# Patient Record
Sex: MALE | Race: WHITE | NOT HISPANIC OR LATINO | Employment: OTHER | URBAN - METROPOLITAN AREA
[De-identification: names, ages, dates, MRNs, and addresses within clinical notes are randomized per-mention and may not be internally consistent; named-entity substitution may affect disease eponyms.]

---

## 2018-03-08 ENCOUNTER — HOSPITAL ENCOUNTER (OUTPATIENT)
Dept: RADIOLOGY | Facility: MEDICAL CENTER | Age: 48
End: 2018-03-08
Attending: ORTHOPAEDIC SURGERY
Payer: COMMERCIAL

## 2018-03-08 DIAGNOSIS — M24.152 ARTICULAR CARTILAGE DISORDER OF HIP, LEFT: ICD-10-CM

## 2018-03-08 DIAGNOSIS — M25.552 LEFT HIP PAIN: ICD-10-CM

## 2018-03-08 PROCEDURE — A9579 GAD-BASE MR CONTRAST NOS,1ML: HCPCS | Performed by: ORTHOPAEDIC SURGERY

## 2018-03-08 PROCEDURE — 700117 HCHG RX CONTRAST REV CODE 255: Performed by: ORTHOPAEDIC SURGERY

## 2018-03-08 PROCEDURE — 20610 DRAIN/INJ JOINT/BURSA W/O US: CPT | Mod: LT

## 2018-03-08 PROCEDURE — 73722 MRI JOINT OF LWR EXTR W/DYE: CPT | Mod: LT

## 2018-03-08 PROCEDURE — 700101 HCHG RX REV CODE 250: Performed by: ORTHOPAEDIC SURGERY

## 2018-03-08 PROCEDURE — 700111 HCHG RX REV CODE 636 W/ 250 OVERRIDE (IP): Performed by: ORTHOPAEDIC SURGERY

## 2018-03-08 RX ORDER — TRIAMCINOLONE ACETONIDE 40 MG/ML
40 INJECTION, SUSPENSION INTRA-ARTICULAR; INTRAMUSCULAR ONCE
Status: COMPLETED | OUTPATIENT
Start: 2018-03-08 | End: 2018-03-08

## 2018-03-08 RX ORDER — LIDOCAINE HYDROCHLORIDE 10 MG/ML
20 INJECTION, SOLUTION INFILTRATION; PERINEURAL ONCE
Status: COMPLETED | OUTPATIENT
Start: 2018-03-08 | End: 2018-03-08

## 2018-03-08 RX ADMIN — IOHEXOL 50 ML: 300 INJECTION, SOLUTION INTRAVENOUS at 15:22

## 2018-03-08 RX ADMIN — GADODIAMIDE 5 ML: 287 INJECTION INTRAVENOUS at 15:22

## 2018-03-08 RX ADMIN — TRIAMCINOLONE ACETONIDE 40 MG: 40 INJECTION, SUSPENSION INTRA-ARTICULAR; INTRAMUSCULAR at 14:30

## 2018-03-08 RX ADMIN — LIDOCAINE HYDROCHLORIDE 6 ML: 10 INJECTION, SOLUTION INFILTRATION; PERINEURAL at 14:30

## 2018-04-05 ENCOUNTER — TELEPHONE (OUTPATIENT)
Dept: MEDICAL GROUP | Facility: LAB | Age: 48
End: 2018-04-05

## 2018-04-05 NOTE — TELEPHONE ENCOUNTER
ESTABLISHED PATIENT PRE-VISIT PLANNING     Note: Patient will not be contacted if there is no indication to call.     1.  Reviewed notes from the last few office visits within the medical group: Yes    2.  If any orders were placed at last visit or intended to be done for this visit (i.e. 6 mos follow-up), do we have Results/Consult Notes?        •  Labs - Labs were not ordered at last office visit.       •  Imaging - Imaging was not ordered at last office visit.       •  Referrals - No referrals were ordered at last office visit.    3. Is this appointment scheduled as a Hospital Follow-Up? No    4.  Immunizations were updated in Epic using WebIZ?: No WebIZ record       •  Web Iz Recommendations:     5.  Patient is due for the following Health Maintenance Topics:   Health Maintenance Due   Topic Date Due   • IMM DTaP/Tdap/Td Vaccine (1 - Tdap) 02/12/1989           6.  MDX printed for Provider? NO    7.  Patient was NOT informed to arrive 15 min prior to their scheduled appointment and bring in their medication bottles.

## 2018-04-06 ENCOUNTER — OFFICE VISIT (OUTPATIENT)
Dept: MEDICAL GROUP | Facility: LAB | Age: 48
End: 2018-04-06
Payer: COMMERCIAL

## 2018-04-06 VITALS
RESPIRATION RATE: 16 BRPM | OXYGEN SATURATION: 94 % | BODY MASS INDEX: 37.11 KG/M2 | TEMPERATURE: 97.9 F | HEART RATE: 70 BPM | DIASTOLIC BLOOD PRESSURE: 70 MMHG | SYSTOLIC BLOOD PRESSURE: 128 MMHG | WEIGHT: 274 LBS | HEIGHT: 72 IN

## 2018-04-06 DIAGNOSIS — Z13.220 SCREENING FOR HYPERLIPIDEMIA: ICD-10-CM

## 2018-04-06 DIAGNOSIS — R61 NIGHT SWEATS: ICD-10-CM

## 2018-04-06 DIAGNOSIS — R10.32 LLQ ABDOMINAL PAIN: ICD-10-CM

## 2018-04-06 DIAGNOSIS — E55.9 VITAMIN D DEFICIENCY: ICD-10-CM

## 2018-04-06 DIAGNOSIS — R06.83 SNORING: ICD-10-CM

## 2018-04-06 DIAGNOSIS — R73.01 ELEVATED FASTING GLUCOSE: ICD-10-CM

## 2018-04-06 DIAGNOSIS — E66.9 OBESITY (BMI 30-39.9): ICD-10-CM

## 2018-04-06 DIAGNOSIS — M25.552 CHRONIC LEFT HIP PAIN: ICD-10-CM

## 2018-04-06 DIAGNOSIS — G89.29 CHRONIC LEFT HIP PAIN: ICD-10-CM

## 2018-04-06 DIAGNOSIS — F17.200 TOBACCO DEPENDENCE: ICD-10-CM

## 2018-04-06 PROCEDURE — 99214 OFFICE O/P EST MOD 30 MIN: CPT | Performed by: FAMILY MEDICINE

## 2018-04-06 ASSESSMENT — ENCOUNTER SYMPTOMS
BRUISES/BLEEDS EASILY: 0
NECK PAIN: 0
HEADACHES: 0
SHORTNESS OF BREATH: 0
DEPRESSION: 0
ROS GI COMMENTS: HEMORRHOIDS
ABDOMINAL PAIN: 1
DOUBLE VISION: 0

## 2018-04-06 ASSESSMENT — PATIENT HEALTH QUESTIONNAIRE - PHQ9: CLINICAL INTERPRETATION OF PHQ2 SCORE: 0

## 2018-04-06 NOTE — PROGRESS NOTES
Subjective:      Matthias Medina is a 48 y.o. male who presents with     Chief Complaint   Patient presents with   • Abdominal Pain     left lower X 1year             HPI    LLQ abdominal pain. This is an ongoing, on the left lower side. A couple providers have looked at it. Had colonscopy and didn't find anything. This was > 1 year ago. Pain depends. Has it now. Is not all the time. Happens every day. Has had this > 1 year. Tried changing diet and no help. Pain is kind of sharp. Does not radiate.  No fever. At night he will have some hot flashes and he gets soaking wet. Area of pain in the LLQ is getting bigger over time     Vitamin d deficiency. He is taking vitamin d with milk. Elbow pain is better.     Sleeps pretty good. Will wake up at 3 to go to the bathroom. Does snore and does not stop breathing. Some daytime fatigue. Just started going to the gym. ESS score of 6 today     New patient health questionnaire reviewed and scanned into media    Patient Active Problem List   Diagnosis   • LLQ abdominal pain   • Elevated fasting glucose   • Vitamin D deficiency   • Tobacco dependence   • Chronic left hip pain   • Snoring   • Obesity (BMI 30-39.9)   • Night sweats       Family History   Problem Relation Age of Onset   • Hypertension Father    • Diabetes Father    • Lung Disease Neg Hx    • Cancer Neg Hx    • Heart Disease Neg Hx    • Stroke Neg Hx      Social History     Social History   • Marital status:      Spouse name: N/A   • Number of children: N/A   • Years of education: N/A     Occupational History   • Not on file.     Social History Main Topics   • Smoking status: Never Smoker   • Smokeless tobacco: Current User     Types: Chew      Comment: one can a day    • Alcohol use 0.0 oz/week      Comment: socially   • Drug use: No   • Sexual activity: Yes     Partners: Female     Other Topics Concern   • Not on file     Social History Narrative   • No narrative on file     No current outpatient prescriptions  on file.      Review of Systems   Constitutional:        Frequent infections or boils   HENT: Negative for hearing loss.    Eyes: Negative for double vision.   Respiratory: Negative for shortness of breath.    Cardiovascular: Negative for chest pain.   Gastrointestinal: Positive for abdominal pain.        Hemorrhoids   Genitourinary:        Kidney stones   Musculoskeletal: Negative for neck pain.   Neurological: Negative for headaches.   Endo/Heme/Allergies: Does not bruise/bleed easily.   Psychiatric/Behavioral: Negative for depression.     New patient health questionnaire reviewed and scanned into media     Objective:     /70   Pulse 70   Temp 36.6 °C (97.9 °F)   Resp 16   Ht 1.829 m (6')   Wt 124.3 kg (274 lb)   SpO2 94%   BMI 37.16 kg/m²       Physical Exam   Constitutional: He appears well-developed and well-nourished. He is active and cooperative.  Non-toxic appearance. He does not have a sickly appearance. He does not appear ill. No distress.   HENT:   Head: Normocephalic and atraumatic.   Right Ear: Tympanic membrane normal.   Left Ear: Tympanic membrane normal.   Mouth/Throat: Oropharynx is clear and moist and mucous membranes are normal.   Large tongue, large neck, narrow oropharynx   Eyes: Conjunctivae and EOM are normal.   Cardiovascular: Normal rate, regular rhythm and normal heart sounds.    Pulmonary/Chest: Effort normal and breath sounds normal. No tachypnea. No respiratory distress. He has no decreased breath sounds. He has no wheezes. He has no rhonchi. He has no rales.   Abdominal: Soft. He exhibits no distension. There is tenderness in the left lower quadrant. There is no rigidity, no rebound and no guarding.   Obese    Neurological: He is alert. He is not disoriented. He displays no tremor. He displays no seizure activity. Coordination and gait normal.   Skin: Skin is warm and dry. He is not diaphoretic.   Psychiatric: He has a normal mood and affect. His speech is normal and  behavior is normal. He is not actively hallucinating. He is attentive.               3/8/2018 4:30 PM    HISTORY/REASON FOR EXAM:  Hip pain no trauma  Left hip pain    TECHNIQUE/EXAM DESCRIPTION:  MR arthrogram of the LEFT hip.    Using a [employer] 1.5 Rosa MRI scanner, T1 oblique coronal, axial, and sagittal fat-suppressed, fast spin-echo T2 fat-suppressed coronal, and 3D fast spoiled-gradient fat-suppressed coronal, and radial T1 images were obtained of the hip following the   intra-articular administration of 0.1 mL dilute Omniscan.    COMPARISON: Images from injection procedure 3/8/2018    FINDINGS:  BONES, BONE MARROW, and CARTILAGE: There is minimal convexity at the lateral femoral head/neck junction. This morphologic abnormality can be associated with the clinical syndrome of femoroacetabular impingement.    Articular cartilage is intact    Bone marrow is normal    ACETABULAR LABRUM: Normal in appearance.    TENDONS: Normal in appearance.    VISUALIZED PELVIS: Within normal limits    LUMBAR SPINE: Within normal limits   Impression       1.  Intact acetabular labrum and articular cartilage    2.  Minimal convexity at the lateral femoral head/neck junction which can be associated with the clinical syndrome of femoroacetabular impingement     [place] Sleepiness Scale score of 6     Assessment/Plan:     1. LLQ abdominal pain  Uncontrolled. Check labs, CT of the abdomen, pelvis.  - COMP METABOLIC PANEL; Future  - CBC WITH DIFFERENTIAL; Future  - CT-ABDOMEN-PELVIS WITH & W/O; Future    2. Vitamin D deficiency  Check vitamin D level  - VITAMIN D,25 HYDROXY; Future    3. Elevated fasting glucose  Check labs  - COMP METABOLIC PANEL; Future  - HEMOGLOBIN A1C; Future    4. Chronic left hip pain  Followed by specialists    5. Snoring  May need to do sleep study    6. Tobacco dependence  Encouraged chewing tobacco cessation    7. Obesity (BMI 30-39.9)  Patient is working on weight loss    8. Night sweats  Uncontrolled.  Check labs  - COMP METABOLIC PANEL; Future  - CBC WITH DIFFERENTIAL; Future  - TSH; Future  - FREE THYROXINE; Future    9. Screening for hyperlipidemia  Check fasting lipid panel  - LIPID PROFILE; Future      Likely patient will need to have a sleep study. He has an [place] Sleepiness Scale score of 6. He is trying to lose weight however. We did discuss this today    Patient is a follow-up after he has his labs done and after he has the CT scan of his abdomen.      Please note that this dictation was created using voice recognition software. I have made every reasonable attempt to correct obvious errors, but I expect that there are errors of grammar and possibly content that I did not discover before finalizing the note.

## 2018-04-16 ENCOUNTER — NON-PROVIDER VISIT (OUTPATIENT)
Dept: MEDICAL GROUP | Facility: CLINIC | Age: 48
End: 2018-04-16
Payer: COMMERCIAL

## 2018-04-16 ENCOUNTER — HOSPITAL ENCOUNTER (OUTPATIENT)
Facility: MEDICAL CENTER | Age: 48
End: 2018-04-16
Attending: FAMILY MEDICINE
Payer: COMMERCIAL

## 2018-04-16 DIAGNOSIS — R10.32 LLQ ABDOMINAL PAIN: ICD-10-CM

## 2018-04-16 DIAGNOSIS — R73.01 ELEVATED FASTING GLUCOSE: ICD-10-CM

## 2018-04-16 DIAGNOSIS — R61 NIGHT SWEATS: ICD-10-CM

## 2018-04-16 DIAGNOSIS — Z13.220 SCREENING FOR HYPERLIPIDEMIA: ICD-10-CM

## 2018-04-16 DIAGNOSIS — E55.9 VITAMIN D DEFICIENCY: ICD-10-CM

## 2018-04-16 DIAGNOSIS — Z01.89 ROUTINE LAB DRAW: ICD-10-CM

## 2018-04-16 LAB
25(OH)D3 SERPL-MCNC: 20 NG/ML (ref 30–100)
ALBUMIN SERPL BCP-MCNC: 4.2 G/DL (ref 3.2–4.9)
ALBUMIN/GLOB SERPL: 1.8 G/DL
ALP SERPL-CCNC: 50 U/L (ref 30–99)
ALT SERPL-CCNC: 41 U/L (ref 2–50)
ANION GAP SERPL CALC-SCNC: 7 MMOL/L (ref 0–11.9)
AST SERPL-CCNC: 25 U/L (ref 12–45)
BASOPHILS # BLD AUTO: 0.5 % (ref 0–1.8)
BASOPHILS # BLD: 0.04 K/UL (ref 0–0.12)
BILIRUB SERPL-MCNC: 0.4 MG/DL (ref 0.1–1.5)
BUN SERPL-MCNC: 14 MG/DL (ref 8–22)
CALCIUM SERPL-MCNC: 9.3 MG/DL (ref 8.5–10.5)
CHLORIDE SERPL-SCNC: 105 MMOL/L (ref 96–112)
CHOLEST SERPL-MCNC: 195 MG/DL (ref 100–199)
CO2 SERPL-SCNC: 26 MMOL/L (ref 20–33)
CREAT SERPL-MCNC: 1.1 MG/DL (ref 0.5–1.4)
EOSINOPHIL # BLD AUTO: 0.12 K/UL (ref 0–0.51)
EOSINOPHIL NFR BLD: 1.6 % (ref 0–6.9)
ERYTHROCYTE [DISTWIDTH] IN BLOOD BY AUTOMATED COUNT: 42.2 FL (ref 35.9–50)
GLOBULIN SER CALC-MCNC: 2.4 G/DL (ref 1.9–3.5)
GLUCOSE SERPL-MCNC: 146 MG/DL (ref 65–99)
HCT VFR BLD AUTO: 48.2 % (ref 42–52)
HDLC SERPL-MCNC: 49 MG/DL
HGB BLD-MCNC: 15.5 G/DL (ref 14–18)
IMM GRANULOCYTES # BLD AUTO: 0.03 K/UL (ref 0–0.11)
IMM GRANULOCYTES NFR BLD AUTO: 0.4 % (ref 0–0.9)
LDLC SERPL CALC-MCNC: 116 MG/DL
LYMPHOCYTES # BLD AUTO: 1.78 K/UL (ref 1–4.8)
LYMPHOCYTES NFR BLD: 23.8 % (ref 22–41)
MCH RBC QN AUTO: 29.2 PG (ref 27–33)
MCHC RBC AUTO-ENTMCNC: 32.2 G/DL (ref 33.7–35.3)
MCV RBC AUTO: 90.8 FL (ref 81.4–97.8)
MONOCYTES # BLD AUTO: 0.5 K/UL (ref 0–0.85)
MONOCYTES NFR BLD AUTO: 6.7 % (ref 0–13.4)
NEUTROPHILS # BLD AUTO: 5 K/UL (ref 1.82–7.42)
NEUTROPHILS NFR BLD: 67 % (ref 44–72)
NRBC # BLD AUTO: 0 K/UL
NRBC BLD-RTO: 0 /100 WBC
PLATELET # BLD AUTO: 192 K/UL (ref 164–446)
PMV BLD AUTO: 13 FL (ref 9–12.9)
POTASSIUM SERPL-SCNC: 4.4 MMOL/L (ref 3.6–5.5)
PROT SERPL-MCNC: 6.6 G/DL (ref 6–8.2)
RBC # BLD AUTO: 5.31 M/UL (ref 4.7–6.1)
SODIUM SERPL-SCNC: 138 MMOL/L (ref 135–145)
T4 FREE SERPL-MCNC: 0.8 NG/DL (ref 0.53–1.43)
TRIGL SERPL-MCNC: 148 MG/DL (ref 0–149)
TSH SERPL DL<=0.005 MIU/L-ACNC: 2.19 UIU/ML (ref 0.38–5.33)
WBC # BLD AUTO: 7.5 K/UL (ref 4.8–10.8)

## 2018-04-16 PROCEDURE — 84439 ASSAY OF FREE THYROXINE: CPT

## 2018-04-16 PROCEDURE — 83036 HEMOGLOBIN GLYCOSYLATED A1C: CPT

## 2018-04-16 PROCEDURE — 85025 COMPLETE CBC W/AUTO DIFF WBC: CPT

## 2018-04-16 PROCEDURE — 80061 LIPID PANEL: CPT

## 2018-04-16 PROCEDURE — 84443 ASSAY THYROID STIM HORMONE: CPT

## 2018-04-16 PROCEDURE — 99000 SPECIMEN HANDLING OFFICE-LAB: CPT | Performed by: PHYSICIAN ASSISTANT

## 2018-04-16 PROCEDURE — 82306 VITAMIN D 25 HYDROXY: CPT

## 2018-04-16 PROCEDURE — 36415 COLL VENOUS BLD VENIPUNCTURE: CPT | Performed by: PHYSICIAN ASSISTANT

## 2018-04-16 PROCEDURE — 80053 COMPREHEN METABOLIC PANEL: CPT

## 2018-04-17 ENCOUNTER — TELEPHONE (OUTPATIENT)
Dept: MEDICAL GROUP | Facility: LAB | Age: 48
End: 2018-04-17

## 2018-04-17 LAB
EST. AVERAGE GLUCOSE BLD GHB EST-MCNC: 143 MG/DL
HBA1C MFR BLD: 6.6 % (ref 0–5.6)

## 2018-04-17 NOTE — LETTER
April 19, 2018        Matthias Medina  [id number] [facility] [address]        Dear Matthias:    Please call and schedule an appointment to follow up on recent labs.    If you have any questions or concerns, please don't hesitate to call.        Sincerely,        Rosalva Holland M.D.    Electronically Signed

## 2018-04-17 NOTE — TELEPHONE ENCOUNTER
----- Message from Rosalva Holland M.D. sent at 4/17/2018 12:09 PM PDT -----  Please have patient make an appt to discuss labs

## 2018-05-03 ENCOUNTER — HOSPITAL ENCOUNTER (OUTPATIENT)
Dept: RADIOLOGY | Facility: MEDICAL CENTER | Age: 48
End: 2018-05-03
Attending: FAMILY MEDICINE
Payer: COMMERCIAL

## 2018-05-03 DIAGNOSIS — R10.32 LLQ ABDOMINAL PAIN: ICD-10-CM

## 2018-05-03 PROCEDURE — 700117 HCHG RX CONTRAST REV CODE 255: Performed by: FAMILY MEDICINE

## 2018-05-03 PROCEDURE — 74177 CT ABD & PELVIS W/CONTRAST: CPT

## 2018-05-03 RX ADMIN — IOHEXOL 100 ML: 350 INJECTION, SOLUTION INTRAVENOUS at 10:30

## 2018-05-16 ENCOUNTER — TELEPHONE (OUTPATIENT)
Dept: MEDICAL GROUP | Facility: LAB | Age: 48
End: 2018-05-16

## 2018-05-16 NOTE — TELEPHONE ENCOUNTER
ESTABLISHED PATIENT PRE-VISIT PLANNING     Note: Patient will not be contacted if there is no indication to call.     1.  Reviewed notes from the last few office visits within the medical group: Yes    2.  If any orders were placed at last visit or intended to be done for this visit (i.e. 6 mos follow-up), do we have Results/Consult Notes?        •  Labs - Labs ordered, completed on 4/16/18 and results are in chart.       •  Imaging - Imaging ordered, completed and results are in chart.       •  Referrals - No referrals were ordered at last office visit.    3. Is this appointment scheduled as a Hospital Follow-Up? No    4.  Immunizations were updated in Epic using WebIZ?: No WebIZ record       •  Web Iz Recommendations:     5.  Patient is due for the following Health Maintenance Topics:   Health Maintenance Due   Topic Date Due   • IMM DTaP/Tdap/Td Vaccine (1 - Tdap) 02/12/1989           6.  MDX printed for Provider? NO    7.  Patient was NOT informed to arrive 15 min prior to their scheduled appointment and bring in their medication bottles.

## 2018-05-17 ENCOUNTER — APPOINTMENT (OUTPATIENT)
Dept: MEDICAL GROUP | Facility: LAB | Age: 48
End: 2018-05-17
Payer: COMMERCIAL

## 2018-06-06 ENCOUNTER — TELEPHONE (OUTPATIENT)
Dept: MEDICAL GROUP | Facility: LAB | Age: 48
End: 2018-06-06

## 2018-06-06 NOTE — TELEPHONE ENCOUNTER
ESTABLISHED PATIENT PRE-VISIT PLANNING     Note: Patient will not be contacted if there is no indication to call.     1.  Reviewed notes from the last few office visits within the medical group: Yes    2.  If any orders were placed at last visit or intended to be done for this visit (i.e. 6 mos follow-up), do we have Results/Consult Notes?        •  Labs - Labs ordered, completed on 4/17/18 and results are in chart.       •  Imaging - Imaging ordered, completed and results are in chart.       •  Referrals - No referrals were ordered at last office visit.    3. Is this appointment scheduled as a Hospital Follow-Up? No    4.  Immunizations were updated in Epic using WebIZ?: No WebIZ record       •  Web Iz Recommendations:     5.  Patient is due for the following Health Maintenance Topics:   Health Maintenance Due   Topic Date Due   • IMM DTaP/Tdap/Td Vaccine (1 - Tdap) 02/12/1989           6.  MDX printed for Provider? NO    7.  Patient was NOT informed to arrive 15 min prior to their scheduled appointment and bring in their medication bottles.

## 2018-06-21 ENCOUNTER — TELEPHONE (OUTPATIENT)
Dept: MEDICAL GROUP | Facility: LAB | Age: 48
End: 2018-06-21

## 2018-06-22 ENCOUNTER — APPOINTMENT (OUTPATIENT)
Dept: MEDICAL GROUP | Facility: LAB | Age: 48
End: 2018-06-22
Payer: COMMERCIAL

## 2018-06-28 ENCOUNTER — OFFICE VISIT (OUTPATIENT)
Dept: MEDICAL GROUP | Facility: LAB | Age: 48
End: 2018-06-28
Payer: COMMERCIAL

## 2018-06-28 VITALS
RESPIRATION RATE: 12 BRPM | BODY MASS INDEX: 36.84 KG/M2 | SYSTOLIC BLOOD PRESSURE: 106 MMHG | WEIGHT: 272 LBS | OXYGEN SATURATION: 91 % | DIASTOLIC BLOOD PRESSURE: 78 MMHG | TEMPERATURE: 98.1 F | HEIGHT: 72 IN | HEART RATE: 62 BPM

## 2018-06-28 DIAGNOSIS — K76.0 FATTY LIVER: ICD-10-CM

## 2018-06-28 DIAGNOSIS — E11.9 CONTROLLED TYPE 2 DIABETES MELLITUS WITHOUT COMPLICATION, WITHOUT LONG-TERM CURRENT USE OF INSULIN (HCC): ICD-10-CM

## 2018-06-28 DIAGNOSIS — K57.30 DIVERTICULOSIS OF LARGE INTESTINE WITHOUT HEMORRHAGE: ICD-10-CM

## 2018-06-28 PROCEDURE — 99214 OFFICE O/P EST MOD 30 MIN: CPT | Performed by: NURSE PRACTITIONER

## 2018-06-28 NOTE — PROGRESS NOTES
Chief Complaint   Patient presents with   • Abdominal Pain     F/V CT scan & lab results        DAWNA Rizzo is a 48-year-old established male here to follow-up on labs and CT scan done a few months ago.  Unfortunately his A1c showed that he has new onset type 2 diabetes.  He tells me that in the past he has been told he has prediabetes.  He feels well, he denies any symptoms related to this such as changes in his vision, urination or peripheral sensations.  Denies polyuria.  He does admit that he eats a lot of carbohydrates, typically biscuits and gravy in the morning around 10 AM with possibly hashbrowns or potatoes and then frequently pasta at night.  He drinks whiskey a few nights per week and fairly large amounts, stating he fills up a glass.  He does not exercise at all.  He does have a family history of type 2 diabetes in his father.  He is not on any medications for diabetes.    He like to review his CT scan also.  His CT scan shows a fatty liver and diverticulosis as well as a few small renal cysts.  He suffers from intermittent left lower quadrant pain but tells me this has improved with eliminating some gas producing foods.  He is not having any nausea, vomiting, unintentional weight loss, diarrhea but has occasional constipation.      Past medical, surgical, family, and social history is reviewed and updated in Epic chart by me today.   Medications and allergies reviewed and updated in Epic chart by me today.     ROS:   As documented in history of present illness above    Exam:  Blood pressure 106/78, pulse 62, temperature 36.7 °C (98.1 °F), resp. rate 12, height 1.829 m (6'), weight 123.4 kg (272 lb), SpO2 91 %.  Gen. morbidly obese male, appears healthy in no distress   Skin appropriate for sex and age   HEENT unremarkable   Neck no adenopathy, no nodules or masses palpable   Lungs clear   Heart regular   Extremities no edema   Abdomen: Rounded but nontender  Neuro gait and station normal   Psych  appropriate, calm and interactive.  Very receptive to the conversation.    A/P:  1. Controlled type 2 diabetes mellitus without complication, without long-term current use of insulin (HCC)  -Our visit today was spent discussing his diet, his weight and his exercise routine.  We thoroughly discussed new onset type 2 diabetes.  At this point he is very motivated to lose weight, start exercising and changing his diet.  He declined starting any medication or checking his blood sugars.  He is willing to reconsider this in 2 and half months at our follow-up visit for an A1c recheck.  We went through every meal that he eats as well as all of his liquid intake and he was given advice on changes that he needs to make.  We discussed long-term repercussions of type 2 diabetes and he verbalized understanding.  He declined diabetic educational classes.  He was fairly overwhelmed at the end of our visit, we will discuss statins and ACE inhibitors as well as vaccines and foot care at our next visit.    2. Fatty liver  -As above, he will start changing his diet to include much more fiber and less animal fat.    3. Diverticulosis of large intestine without hemorrhage  -Improving with some dietary changes he has already made.  He is not in any pain today.  We discussed symptoms of diverticulitis.    Total face to face time 25 minutes of which over 50% of this visit is spent in counseling, education and outlining a plan of treatment and coordination of care for the above conditions. This included but was not limited to discussion of medication options and potential risks related to the medications, referral and specialty care options. All patient questions were answered

## 2018-08-06 ENCOUNTER — PATIENT OUTREACH (OUTPATIENT)
Dept: HEALTH INFORMATION MANAGEMENT | Facility: OTHER | Age: 48
End: 2018-08-06

## 2018-08-06 NOTE — PROGRESS NOTES
Outcome: Left Message    Please transfer to Patient Outreach Team at [phone] when patient returns call.    WebIZ Checked & Epic Updated:  yes    HealthConnect Verified: yes    Attempt # 1

## 2018-08-16 ENCOUNTER — HOSPITAL ENCOUNTER (OUTPATIENT)
Dept: RADIOLOGY | Facility: MEDICAL CENTER | Age: 48
End: 2018-08-16
Attending: CHIROPRACTOR
Payer: COMMERCIAL

## 2018-08-16 DIAGNOSIS — M51.26 DISPLACEMENT OF LUMBAR INTERVERTEBRAL DISC WITHOUT MYELOPATHY: ICD-10-CM

## 2018-08-16 PROCEDURE — 72148 MRI LUMBAR SPINE W/O DYE: CPT

## 2018-09-03 NOTE — PROGRESS NOTES
Outcome: Left Message    Please transfer to Patient Outreach Team at [phone] when patient returns call.    Attempt # 2

## 2018-09-10 ENCOUNTER — HOSPITAL ENCOUNTER (OUTPATIENT)
Dept: RADIOLOGY | Facility: MEDICAL CENTER | Age: 48
End: 2018-09-10
Attending: NEUROLOGICAL SURGERY
Payer: COMMERCIAL

## 2018-09-10 DIAGNOSIS — M54.50 CHRONIC LOW BACK PAIN WITHOUT SCIATICA, UNSPECIFIED BACK PAIN LATERALITY: ICD-10-CM

## 2018-09-10 DIAGNOSIS — G89.29 CHRONIC LOW BACK PAIN WITHOUT SCIATICA, UNSPECIFIED BACK PAIN LATERALITY: ICD-10-CM

## 2018-09-10 PROCEDURE — 72110 X-RAY EXAM L-2 SPINE 4/>VWS: CPT

## 2018-09-14 ENCOUNTER — TELEPHONE (OUTPATIENT)
Dept: MEDICAL GROUP | Facility: LAB | Age: 48
End: 2018-09-14

## 2018-09-14 DIAGNOSIS — E11.9 CONTROLLED TYPE 2 DIABETES MELLITUS WITHOUT COMPLICATION, WITHOUT LONG-TERM CURRENT USE OF INSULIN (HCC): ICD-10-CM

## 2018-09-14 NOTE — PROGRESS NOTES
1. Attempt #:Final    2. WebIZ Checked & Epic Updated: Yes  3. HealthConnect Verified: yes  4. Verify PCP: yes    5. Communication Preference Obtained: no    6. Diabetes Visit Scheduling  Scheduling Status:Already scheduled      7. Care Gap Scheduling (Attempt to Schedule EACH Overdue Care Gap!)    Health Maintenance Due   Topic Date Due   • DIABETES MONOFILAMENT / LE EXAM  1970   • RETINAL SCREENING  02/12/1988   • URINE ACR / MICROALBUMIN  02/12/1988   • IMM HEP B VACCINE (1 of 3 - Risk 3-dose series) 02/12/1989   • IMM DTaP/Tdap/Td Vaccine (1 - Tdap) 02/12/1989   • IMM PNEUMOCOCCAL 19-64 (ADULT) MEDIUM RISK SERIES (1 of 1 - PPSV23) 02/12/1989   • IMM INFLUENZA (1) 09/01/2018 / Declined Immunizations        8. Patient was directed to Health and Wellness Website: not able to address / Pt hung up  9. Screened for Food Pantry Prescription? no  10. Medabil Activation: already active  11. Medabil Hal: no  12. Virtual Visits: no  13. Opt In to Text Messages: no

## 2018-09-20 ENCOUNTER — TELEPHONE (OUTPATIENT)
Dept: MEDICAL GROUP | Facility: LAB | Age: 48
End: 2018-09-20

## 2018-09-20 ENCOUNTER — OFFICE VISIT (OUTPATIENT)
Dept: MEDICAL GROUP | Facility: LAB | Age: 48
End: 2018-09-20
Payer: COMMERCIAL

## 2018-09-20 VITALS
RESPIRATION RATE: 14 BRPM | BODY MASS INDEX: 36.57 KG/M2 | OXYGEN SATURATION: 93 % | HEART RATE: 73 BPM | TEMPERATURE: 98.2 F | DIASTOLIC BLOOD PRESSURE: 64 MMHG | HEIGHT: 72 IN | WEIGHT: 270 LBS | SYSTOLIC BLOOD PRESSURE: 124 MMHG

## 2018-09-20 DIAGNOSIS — F17.200 TOBACCO DEPENDENCE: ICD-10-CM

## 2018-09-20 DIAGNOSIS — E11.9 TYPE 2 DIABETES MELLITUS WITHOUT COMPLICATION, WITHOUT LONG-TERM CURRENT USE OF INSULIN (HCC): ICD-10-CM

## 2018-09-20 DIAGNOSIS — R79.89 LOW TESTOSTERONE: ICD-10-CM

## 2018-09-20 DIAGNOSIS — E55.9 VITAMIN D DEFICIENCY: ICD-10-CM

## 2018-09-20 DIAGNOSIS — E66.9 OBESITY (BMI 35.0-39.9 WITHOUT COMORBIDITY): ICD-10-CM

## 2018-09-20 LAB
HBA1C MFR BLD: 6.2 % (ref ?–5.8)
INT CON NEG: NEGATIVE
INT CON POS: POSITIVE

## 2018-09-20 PROCEDURE — 83036 HEMOGLOBIN GLYCOSYLATED A1C: CPT | Performed by: NURSE PRACTITIONER

## 2018-09-20 PROCEDURE — 99214 OFFICE O/P EST MOD 30 MIN: CPT | Performed by: NURSE PRACTITIONER

## 2018-09-20 RX ORDER — TESTOSTERONE CYPIONATE 200 MG/ML
100 INJECTION, SOLUTION INTRAMUSCULAR ONCE
Qty: 1 VIAL | Refills: 5 | Status: SHIPPED
Start: 2018-09-20 | End: 2018-09-20

## 2018-09-20 NOTE — TELEPHONE ENCOUNTER
----- Message from ERMA Porter sent at 9/20/2018  7:13 AM PDT -----  Please obtain copies of labs ordered by Bess Dunham in [employer].  Thanks!

## 2018-09-20 NOTE — TELEPHONE ENCOUNTER
MEDICATION PRIOR AUTHORIZATION NEEDED:    1. Name of Medication: testosterone cypionate (DEPO-TESTOSTERONE)    2. Requested By (Name of Pharmacy): [facility]     3. Is insurance on file current? yes    4. What is the name & phone number of the 3rd party payor? [email or web address]

## 2018-09-20 NOTE — PROGRESS NOTES
"Chief Complaint   Patient presents with   • Diabetes       HPI  Matthias is a 48-year-old established male here to follow-up on new onset type 2 diabetes, diagnosed at our visit in June.  His A1c was initially at 6.6.  He tells me that he is made several changes in his lifestyle, cutting out alcohol during the week, reducing his intake of sweets, trying to walk more and reducing his intake of biscuits and hashbrowns in the morning.  He has lost 2 pounds.  He is feeling better.  Denies any vision changes, numbness or tingling to his feet.  Denies any other associated symptoms.  He does not have hypertension or hyperlipidemia.    Vitamin D deficiency: This was also diagnosed at last visit.  He is taking 2000 international units of vitamin D daily.    Testosterone deficiency: Diagnosed by a different primary care in [place], [place].  He was prescribed a topical testosterone which he will not use as his wife has had breast cancer.  He was then told to change to testosterone injections which coincidentally he ended up traveling to [place] immediately afterwards and bought testosterone over-the-counter there.  He and his wife are concerned about the quality of testosterone that they bought in [place] as it caused a large bubble where they injected it.  He did feel better with the testosterone injection.  They have no idea how much testosterone they injected.  He felt more energy for last few weeks after his testosterone injection.  He does struggle with fatigue.      Past medical, surgical, family, and social history is reviewed and updated in Epic chart by me today.   Medications and allergies reviewed and updated in Epic chart by me today.     ROS:   As documented in history of present illness above    Exam:  Blood pressure 124/64, pulse 73, temperature 36.8 °C (98.2 °F), resp. rate 14, height 1.829 m (6' 0.01\"), weight 122.5 kg (270 lb), SpO2 93 %.  Constitutional: Alert, no distress, plus 3 vital signs  Skin:  Warm, dry, no " rashes invisible areas  Eye: Equal, round and reactive, conjunctiva clear  ENMT: Lips without lesions, good dentition, oropharynx clear    Neck: Trachea midline  Respiratory: Unlabored respiration, lungs clear to auscultation, no wheezes, no rhonchi  Cardiovascular: Normal rate and rhythm.  Psych: Alert, pleasant, well-groomed, normal affect  Monofilament testing with a 10 gram force: sensation intact in 4/4 bilaterally.   Visual Inspection: Feet without maceration, ulcers, fissures.       A/P:  1. Type 2 diabetes mellitus without complication, without long-term current use of insulin ([facility])  -A1c today is improved at 6.2.  We are very encouraged by this mutually.  He will continue making further dietary improvements and work harder on exercise.  We set a goal weight loss of 5-10 pounds in the next 3 months, hopefully more if possible.  Discussed the importance of proper foot care and seeing his eye doctor yearly.  Recommend lab work prior to next visit.  - POCT  A1C  - CBC WITH DIFFERENTIAL; Future  - HEMOGLOBIN A1C; Future  - Diabetic Monofilament Lower Extremity Exam    2. Low testosterone  -We will start him on 100 mg of testosterone once a month, he would prefer to come here for that.  Will check his testosterone in 3 months.  Discussed the risk versus benefit of testosterone replacement therapy with the patient and his wife including the potential increased risk of prostate cancer and blood clots.  Encouraged him to donate blood when possible.  - testosterone cypionate (DEPO-TESTOSTERONE) 200 MG/ML Solution injection; 0.5 mL by Intramuscular route Once for 1 dose.  Dispense: 1 Vial; Refill: 5  - CBC WITH DIFFERENTIAL; Future  - TESTOSTERONE SERUM; Future  -declines vaccinations today.     3. Vitamin D deficiency  -He will continue on vitamin D replacement  - VITAMIN D,25 HYDROXY; Future  - CBC WITH DIFFERENTIAL; Future    4. Tobacco dependence  -Counseled regarding the importance of tobacco cessation, he  chews.    5. Obesity (BMI 35.0-39.9 without comorbidity)  -Counseled regarding the importance of weight loss.  Discussed the dangers of morbid obesity with him.  - Patient identified as having weight management issue.  Appropriate orders and counseling given.

## 2018-10-02 NOTE — TELEPHONE ENCOUNTER
FINAL PRIOR AUTHORIZATION STATUS:    1.  Name of Medication & Dose: testosterone cypionate vial     2. Prior Auth Status: Approved through 09/28/18 to 03/27/19     3. Action Taken: Pharmacy Notified: yes Patient Notified: N\A

## 2018-10-05 ENCOUNTER — TELEPHONE (OUTPATIENT)
Dept: MEDICAL GROUP | Facility: LAB | Age: 48
End: 2018-10-05

## 2018-10-05 ENCOUNTER — NON-PROVIDER VISIT (OUTPATIENT)
Dept: MEDICAL GROUP | Facility: LAB | Age: 48
End: 2018-10-05
Payer: COMMERCIAL

## 2018-10-05 DIAGNOSIS — E29.1 HYPOGONADISM IN MALE: ICD-10-CM

## 2018-10-05 DIAGNOSIS — R79.89 LOW TESTOSTERONE IN MALE: ICD-10-CM

## 2018-10-05 PROCEDURE — 96372 THER/PROPH/DIAG INJ SC/IM: CPT | Performed by: INTERNAL MEDICINE

## 2018-10-05 RX ORDER — TESTOSTERONE CYPIONATE 200 MG/ML
200 INJECTION, SOLUTION INTRAMUSCULAR ONCE
Qty: 1 ML | Refills: 0 | OUTPATIENT
Start: 2018-10-05 | End: 2018-10-05

## 2018-10-05 RX ORDER — TESTOSTERONE CYPIONATE 200 MG/ML
200 INJECTION, SOLUTION INTRAMUSCULAR ONCE
Status: COMPLETED | OUTPATIENT
Start: 2018-10-05 | End: 2018-10-05

## 2018-10-05 RX ADMIN — TESTOSTERONE CYPIONATE 200 MG: 200 INJECTION, SOLUTION INTRAMUSCULAR at 16:49

## 2018-10-05 NOTE — PROGRESS NOTES
Matthias Medina is a 48 y.o. male here for a non-provider visit for testosterone injection.    Reason for injection: testosterone  Order in MAR?: Yes  Patient supplied?:Yes  Minimum interval has been met for this injection (per MAR order): No    Order and dose verified by: SIMONA  Patient tolerated injection and no adverse effects were observed or reported: No    # of Administrations remaining in MAR: 0

## 2018-10-05 NOTE — TELEPHONE ENCOUNTER
1. Caller Name: Don                                           Patient is on the MA Schedule today for testosterone vaccine/injection.    SPECIFIC Action To Be Taken:please place order in MAR/patient brought in own medication

## 2018-12-20 ENCOUNTER — OFFICE VISIT (OUTPATIENT)
Dept: MEDICAL GROUP | Facility: LAB | Age: 48
End: 2018-12-20
Payer: COMMERCIAL

## 2018-12-20 VITALS
BODY MASS INDEX: 36.7 KG/M2 | WEIGHT: 271 LBS | HEIGHT: 72 IN | TEMPERATURE: 97.5 F | HEART RATE: 65 BPM | SYSTOLIC BLOOD PRESSURE: 112 MMHG | DIASTOLIC BLOOD PRESSURE: 65 MMHG

## 2018-12-20 DIAGNOSIS — E66.9 OBESITY (BMI 30-39.9): ICD-10-CM

## 2018-12-20 DIAGNOSIS — E29.1 HYPOGONADISM IN MALE: ICD-10-CM

## 2018-12-20 DIAGNOSIS — E11.9 CONTROLLED TYPE 2 DIABETES MELLITUS WITHOUT COMPLICATION, WITHOUT LONG-TERM CURRENT USE OF INSULIN (HCC): ICD-10-CM

## 2018-12-20 LAB
HBA1C MFR BLD: 6.7 % (ref ?–5.8)
INT CON NEG: NEGATIVE
INT CON POS: POSITIVE

## 2018-12-20 PROCEDURE — 83036 HEMOGLOBIN GLYCOSYLATED A1C: CPT | Performed by: NURSE PRACTITIONER

## 2018-12-20 PROCEDURE — 99214 OFFICE O/P EST MOD 30 MIN: CPT | Performed by: NURSE PRACTITIONER

## 2018-12-20 NOTE — PROGRESS NOTES
DAWNA Rizzo is a 48-year-old established male here to follow-up on chronic issues of type 2 diabetes, hypogonadism and obesity.  He is happy to report that he has continued to make dietary changes such as cutting out his morning biscuit intake, avoiding alcohol during the week and cooking at home more often for dinner.  He is not exercising right now but plans on starting in the new year.    #1-DM type II:  Dx 6 mo ago.  Weight was 270 at our last visit, A1C 6.2, down from 6.6 at initial dx. he did not do his labs prior to arrival.  He is not on medication for diabetes.  As above, he has made a lot of good dietary changes.  Denies any problems urinating or with burning in his feet.  He does not have an eye doctor.    #2-hypogonadism: He is back on testosterone, 100 mg/month, given by his wife at home.  He feels that his energy and libido are much better on testosterone.  Denies increased irritability or acne.  He is never had a heart attack or a stroke.    #3-obesity: Chronic issue.  He does not think that he is lost any weight.  He tells me he has been eating a little bit too much this holiday season.  He is not exercising.  He does have chronic back pain and would like to lose weight because of this, he just received a shot in his spine and started physical therapy 3 times a week.    Past medical, surgical, family, and social history is reviewed and updated in Epic chart by me today.   Medications and allergies reviewed and updated in Epic chart by me today.     ROS:   As documented in history of present illness above    Exam:  Blood pressure 112/65, pulse 65, temperature 36.4 °C (97.5 °F), height 1.829 m (6'), weight 122.9 kg (271 lb).    Constitutional: Alert, no distress, plus 3 vital signs  Skin:  Warm, dry, no rashes invisible areas  Eye: Equal, round and reactive, conjunctiva clear  ENMT: Lips without lesions  Respiratory: Unlabored respiration, lungs clear to auscultation, no wheezes, no  rhonchi  Cardiovascular: Normal rate and rhythm  Psych: Alert, pleasant, well-groomed, normal affect    Assessment / Plan / Medical Decision makin. Controlled type 2 diabetes mellitus without complication, without long-term current use of insulin ([facility])  -A1C today 6.7%.  He still very resistant to starting prescription medications and checking his blood sugars.  He tells me that he is very determined to start exercising in the new year as he needs to train for the [employer].  He tells me he is going to lose weight and get in shape.  He is willing to recheck his A1c in March and then start medications as well as start checking his blood sugars if his A1c does not drop below 6.5.  We had a long discussion about the dangers of type 2 diabetes such as retinopathy, neuropathy and nephropathy.  Encouraged him to establish care with an eye doctor.  Discussed proper foot care.  He will follow-up as needed 6 months.  - POCT  A1C    2. Obesity (BMI 30-39.9)  -Unfortunately he gained a pound.  He tells me that he is going to work harder on exercise, portion control and his diet with a goal of losing about 20 pounds over the next 6 months.  Encouraged him to certainly start an exercise program as planned.    3. Hypogonadism in male  -Doing well on testosterone replacement, feeling better.  Discussed the risk versus benefit of testosterone replacement therapy with the patient.  We will check a testosterone level, LP, & CBC in March.

## 2019-04-23 ENCOUNTER — TELEPHONE (OUTPATIENT)
Dept: MEDICAL GROUP | Facility: LAB | Age: 49
End: 2019-04-23

## 2019-07-09 ENCOUNTER — HOSPITAL ENCOUNTER (OUTPATIENT)
Facility: MEDICAL CENTER | Age: 49
End: 2019-07-09
Attending: NURSE PRACTITIONER
Payer: COMMERCIAL

## 2019-07-09 ENCOUNTER — NON-PROVIDER VISIT (OUTPATIENT)
Dept: MEDICAL GROUP | Facility: CLINIC | Age: 49
End: 2019-07-09
Payer: COMMERCIAL

## 2019-07-09 DIAGNOSIS — E11.9 TYPE 2 DIABETES MELLITUS WITHOUT COMPLICATION, WITHOUT LONG-TERM CURRENT USE OF INSULIN (HCC): ICD-10-CM

## 2019-07-09 DIAGNOSIS — E11.9 CONTROLLED TYPE 2 DIABETES MELLITUS WITHOUT COMPLICATION, WITHOUT LONG-TERM CURRENT USE OF INSULIN (HCC): ICD-10-CM

## 2019-07-09 DIAGNOSIS — E55.9 VITAMIN D DEFICIENCY: ICD-10-CM

## 2019-07-09 DIAGNOSIS — R79.89 LOW TESTOSTERONE: ICD-10-CM

## 2019-07-09 DIAGNOSIS — E29.1 HYPOGONADISM IN MALE: ICD-10-CM

## 2019-07-09 LAB
25(OH)D3 SERPL-MCNC: 34 NG/ML (ref 30–100)
BASOPHILS # BLD AUTO: 0.7 % (ref 0–1.8)
BASOPHILS # BLD: 0.04 K/UL (ref 0–0.12)
CHOLEST SERPL-MCNC: 179 MG/DL (ref 100–199)
CREAT UR-MCNC: 342.7 MG/DL
EOSINOPHIL # BLD AUTO: 0.12 K/UL (ref 0–0.51)
EOSINOPHIL NFR BLD: 2.1 % (ref 0–6.9)
ERYTHROCYTE [DISTWIDTH] IN BLOOD BY AUTOMATED COUNT: 40.9 FL (ref 35.9–50)
HCT VFR BLD AUTO: 47.3 % (ref 42–52)
HDLC SERPL-MCNC: 37 MG/DL
HGB BLD-MCNC: 15.8 G/DL (ref 14–18)
IMM GRANULOCYTES # BLD AUTO: 0.01 K/UL (ref 0–0.11)
IMM GRANULOCYTES NFR BLD AUTO: 0.2 % (ref 0–0.9)
LDLC SERPL CALC-MCNC: 88 MG/DL
LYMPHOCYTES # BLD AUTO: 1.29 K/UL (ref 1–4.8)
LYMPHOCYTES NFR BLD: 22.9 % (ref 22–41)
MCH RBC QN AUTO: 30.5 PG (ref 27–33)
MCHC RBC AUTO-ENTMCNC: 33.4 G/DL (ref 33.7–35.3)
MCV RBC AUTO: 91.3 FL (ref 81.4–97.8)
MICROALBUMIN UR-MCNC: <0.7 MG/DL
MICROALBUMIN/CREAT UR: NORMAL MG/G (ref 0–30)
MONOCYTES # BLD AUTO: 0.41 K/UL (ref 0–0.85)
MONOCYTES NFR BLD AUTO: 7.3 % (ref 0–13.4)
NEUTROPHILS # BLD AUTO: 3.77 K/UL (ref 1.82–7.42)
NEUTROPHILS NFR BLD: 66.8 % (ref 44–72)
NRBC # BLD AUTO: 0 K/UL
NRBC BLD-RTO: 0 /100 WBC
PLATELET # BLD AUTO: 197 K/UL (ref 164–446)
PMV BLD AUTO: 12.9 FL (ref 9–12.9)
RBC # BLD AUTO: 5.18 M/UL (ref 4.7–6.1)
TESTOST SERPL-MCNC: 217 NG/DL (ref 175–781)
TRIGL SERPL-MCNC: 268 MG/DL (ref 0–149)
WBC # BLD AUTO: 5.6 K/UL (ref 4.8–10.8)

## 2019-07-09 PROCEDURE — 36415 COLL VENOUS BLD VENIPUNCTURE: CPT | Performed by: PHYSICIAN ASSISTANT

## 2019-07-09 PROCEDURE — 82570 ASSAY OF URINE CREATININE: CPT

## 2019-07-09 PROCEDURE — 80061 LIPID PANEL: CPT

## 2019-07-09 PROCEDURE — 99000 SPECIMEN HANDLING OFFICE-LAB: CPT | Performed by: PHYSICIAN ASSISTANT

## 2019-07-09 PROCEDURE — 82306 VITAMIN D 25 HYDROXY: CPT

## 2019-07-09 PROCEDURE — 85025 COMPLETE CBC W/AUTO DIFF WBC: CPT

## 2019-07-09 PROCEDURE — 82043 UR ALBUMIN QUANTITATIVE: CPT

## 2019-07-09 PROCEDURE — 84403 ASSAY OF TOTAL TESTOSTERONE: CPT

## 2019-07-09 PROCEDURE — 83036 HEMOGLOBIN GLYCOSYLATED A1C: CPT

## 2019-07-10 LAB
EST. AVERAGE GLUCOSE BLD GHB EST-MCNC: 166 MG/DL
HBA1C MFR BLD: 7.4 % (ref 0–5.6)

## 2019-07-11 ENCOUNTER — OFFICE VISIT (OUTPATIENT)
Dept: MEDICAL GROUP | Facility: LAB | Age: 49
End: 2019-07-11
Payer: COMMERCIAL

## 2019-07-11 VITALS
OXYGEN SATURATION: 96 % | BODY MASS INDEX: 37.11 KG/M2 | TEMPERATURE: 98.1 F | WEIGHT: 274 LBS | SYSTOLIC BLOOD PRESSURE: 120 MMHG | HEART RATE: 68 BPM | DIASTOLIC BLOOD PRESSURE: 60 MMHG | HEIGHT: 72 IN | RESPIRATION RATE: 14 BRPM

## 2019-07-11 DIAGNOSIS — E66.9 OBESITY (BMI 35.0-39.9 WITHOUT COMORBIDITY): ICD-10-CM

## 2019-07-11 DIAGNOSIS — E11.9 CONTROLLED TYPE 2 DIABETES MELLITUS WITHOUT COMPLICATION, WITHOUT LONG-TERM CURRENT USE OF INSULIN (HCC): ICD-10-CM

## 2019-07-11 DIAGNOSIS — E34.9 TESTOSTERONE DEFICIENCY: ICD-10-CM

## 2019-07-11 PROCEDURE — 99214 OFFICE O/P EST MOD 30 MIN: CPT | Performed by: NURSE PRACTITIONER

## 2019-07-11 RX ORDER — METFORMIN HYDROCHLORIDE 500 MG/1
500 TABLET, EXTENDED RELEASE ORAL DAILY
Qty: 30 TAB | Refills: 5 | Status: SHIPPED | OUTPATIENT
Start: 2019-07-11 | End: 2020-01-31

## 2019-07-11 ASSESSMENT — PATIENT HEALTH QUESTIONNAIRE - PHQ9: CLINICAL INTERPRETATION OF PHQ2 SCORE: 0

## 2019-07-11 NOTE — PROGRESS NOTES
Chief Complaint   Patient presents with   • Diabetes     follow up labs       HPI  Matthias is a 50 yo established male here to follow-up on type 2 diabetes, testosterone deficiency and obesity.    Controlled type 2 diabetes mellitus without complication, without long-term current use of insulin ([facility])  Dx about a year ago with an A1c of just 6.6.  Initially he preferred to refrain from taking medication for diabetes and to change his lifestyle.  Unfortunately his A1c is now up to 7.4.  He tells me that he is walking a lot for exercise 5 d per week, mainly through work.  Trying to stick to protein at breakfast, salads at lunch and carbs / meat at dinner but admits that he has probably not been eating as healthy as he should.  Not checking blood sugars.  No DM meds at this at time.  Denies vision changes.  No foot pain / burning. Denies increased urination.      Obesity:  Chronic issue.  Is struggling to lose weight.  Struggles with portions.    Testosterone deficiency:  Chronic issue.  Has been off of testosterone replacement and is fairly indecisive if he would like to restart testosterone.  Most recent testosterone level came back at 217.    Past medical, surgical, family, and social history is reviewed and updated in Epic chart by me today.   Medications and allergies reviewed and updated in Epic chart by me today.     ROS:   As documented in history of present illness above      Exam:  /60 (BP Location: Left arm, Patient Position: Sitting, BP Cuff Size: Large adult)   Pulse 68   Temp 36.7 °C (98.1 °F)   Resp 14   Ht 1.829 m (6')   Wt 124.3 kg (274 lb)   SpO2 96%   Gen. Overweight, appears healthy in no distress   Skin appropriate for sex and age   Neck trachea is midline  Lungs unlabored breathing  Heart regular rate  Neuro gait and station normal   Psych appropriate, calm, interactive, well-groomed    Assessment / Plan / Medical Decision makin. Controlled type 2 diabetes mellitus without  complication, without long-term current use of insulin ([facility])  -Unfortunately his A1c is gradually increasing, now up to 7.4.  Encouraged him to start metformin which he was certainly willing to do.  Discussed how to take metformin, how it works and potential side effects.  I will see him back here in 3 months to check an A1c in the office.  He declines all vaccines.  Fortunately his microalbumin is undetectable.  Discussed proper foot care.  Encouraged him to make an appointment with optometry or ophthalmology.  - metFORMIN ER (GLUCOPHAGE XR) 500 MG TABLET SR 24 HR; Take 1 Tab by mouth every day. At night with dinner  Dispense: 30 Tab; Refill: 5    2. Obesity (BMI 35.0-39.9 without comorbidity) ([facility])  -Encouraged him to work on portion control, eat healthier and lose weight.  Hopefully at our visit in 3 months he will be down 10 to 20 pounds.    3. Testosterone deficiency  -He was indecisive about testosterone.  He will let me know if he would like to restart testosterone.

## 2019-07-11 NOTE — ASSESSMENT & PLAN NOTE
Dx about a year ago.  Walking a lot for exercise 5 d per week.  Trying to stick to protein at breakfast, salads at lunch and carbs / meat at dinner.  Not checking blood sugars.  No DM meds at this at time.  Denies vision changes.  No foot pain / burning. Denies increased urination.

## 2019-10-14 ENCOUNTER — OFFICE VISIT (OUTPATIENT)
Dept: MEDICAL GROUP | Facility: LAB | Age: 49
End: 2019-10-14
Payer: COMMERCIAL

## 2019-10-14 VITALS
DIASTOLIC BLOOD PRESSURE: 70 MMHG | TEMPERATURE: 97.8 F | BODY MASS INDEX: 37.11 KG/M2 | OXYGEN SATURATION: 95 % | SYSTOLIC BLOOD PRESSURE: 128 MMHG | RESPIRATION RATE: 16 BRPM | WEIGHT: 274 LBS | HEART RATE: 56 BPM | HEIGHT: 72 IN

## 2019-10-14 DIAGNOSIS — M54.50 CHRONIC BILATERAL LOW BACK PAIN WITHOUT SCIATICA: ICD-10-CM

## 2019-10-14 DIAGNOSIS — G89.29 CHRONIC BILATERAL LOW BACK PAIN WITHOUT SCIATICA: ICD-10-CM

## 2019-10-14 DIAGNOSIS — E11.9 CONTROLLED TYPE 2 DIABETES MELLITUS WITHOUT COMPLICATION, WITHOUT LONG-TERM CURRENT USE OF INSULIN (HCC): ICD-10-CM

## 2019-10-14 DIAGNOSIS — E66.9 OBESITY (BMI 35.0-39.9 WITHOUT COMORBIDITY): ICD-10-CM

## 2019-10-14 DIAGNOSIS — Z12.5 SCREENING FOR PROSTATE CANCER: ICD-10-CM

## 2019-10-14 LAB
HBA1C MFR BLD: 6.9 % (ref 0–5.6)
INT CON NEG: NEGATIVE
INT CON POS: POSITIVE

## 2019-10-14 PROCEDURE — 83036 HEMOGLOBIN GLYCOSYLATED A1C: CPT | Performed by: NURSE PRACTITIONER

## 2019-10-14 PROCEDURE — 99214 OFFICE O/P EST MOD 30 MIN: CPT | Performed by: NURSE PRACTITIONER

## 2019-10-14 NOTE — PROGRESS NOTES
CC  f/u    HPI  Matthias is a 49-year-old established male here to follow-up on chronic issues of type 2 diabetes, obesity and low back pain.    #1-controlled type 2 diabetes mellitus without complication, without long-term current use of insulin ([facility])  Chronic issue.  Dx over a year ago.  A1c climbed to 7.4 from 6.6 at our visit in July and he was started on metformin.    Side effects from metformin / compliancy: Denies any GI side effects from metformin, stating he really does not even realize he takes it.  He is compliant with taking metformin daily.  Home blood sugar readings: not checking  Exercise: walks at work daily - 5 d per week  Urinary or bowel issues: denies any issues. Nocturia x one  Foot numbness or tingling/pain:denies  Vision changes: denies  Wounds that will not heal: denies    #2-chronic low back pain: He did start with a physiatrist, had a few injections and these were not helpful.  He attended a few physical therapy sessions but really does not have time for that in life, stating work is too busy.  He is planning on starting a walking program with his wife and losing some weight over the next few months.  His back pain persists but is not worsening.  Denies numbness or tingling in his feet.    #3-obesity: Persistent issue but fortunately he has not gained weight.  His wife is bugging him to start a walking program which he plans on doing in the next few days.  He is a bit better about his portions and is avoiding heavy breakfast foods.    Past medical, surgical, family, and social history is reviewed and updated in Epic chart by me today.   Medications and allergies reviewed and updated in Epic chart by me today.     ROS:   As documented in history of present illness above    Exam:    Vitals:    10/14/19 0716   BP: 128/70   Pulse: (!) 56   Resp: 16   Temp: 36.6 °C (97.8 °F)   SpO2: 95%       Constitutional: Alert, no distress, plus 3 vital signs  Skin:  Warm, dry, no rashes invisible areas  Eye:  Equal, round and reactive, conjunctiva clear  ENMT: Lips without lesions, good dentition, oropharynx clear    Neck: Trachea midline, no masses, no thyromegaly  Respiratory: Unlabored respiration, lungs clear to auscultation, no wheezes, no rhonchi  Cardiovascular: Normal rate and rhythm, no murmur, no edema  Musculoskeletal: His gait is without a limp.  He has full sensation in bilateral lower extremities.  Psych: Alert, pleasant, well-groomed, normal affect  Monofilament testing with a 10 gram force: sensation intact in 4/4 bilaterally.   Visual Inspection: Feet without maceration, ulcers, fissures.     Assessment / Plan / Medical Decision makin. Controlled type 2 diabetes mellitus without complication, without long-term current use of insulin ([facility])  -Improving, A1c is down to 6.9.  He declines a glucometer.  Discussed the importance of weight loss, portion control, following a diabetic diet, proper foot care, exercising and establishing with an eye doctor.  I will see him back here in 6 months after labs.  Continue metformin.  We discussed repercussions of type 2 diabetes in depth, such as retinopathy, neuropathy, coronary artery disease and nephropathy.  - Comp Metabolic Panel; Future  - CBC WITH DIFFERENTIAL; Future  - HEMOGLOBIN A1C; Future    2. Screening for prostate cancer  - PROSTATE SPECIFIC AG SCREENING; Future    3. Chronic bilateral low back pain without sciatica  -Persistent but not worsening.  States he has no time for physical therapy.  We discussed physical therapy exercises, stretches, strengthening and trunk work that he can do at home.  I encouraged him to work on weight loss and stretching.    4.  Obesity:  As above, discussed the importance of portion control, diabetic diet, establishing an exercise program and losing weight.  Discussed long-term complications of long-standing obesity.  We set a mutual weight loss goal of 10 pounds over the next 3 to 6 months.    In terms of healthcare  maintenance, he declines all vaccines.  We discussed the potential complications of avoiding flu, pneumonia, hepatitis B and tetanus vaccines.

## 2019-10-14 NOTE — ASSESSMENT & PLAN NOTE
Chronic issue.  Dx over a year ago.  A1c climbed to 7.4 from 6.6 at our visit in July and he was started on metformin.    Side effects from metformin / compliancy:  Home blood sugar readings:  Exercise:  Urinary or bowel issues:  Foot numbness or tingling/pain:  Vision changes:  Wounds that will not heal:

## 2019-11-07 ENCOUNTER — HOSPITAL ENCOUNTER (OUTPATIENT)
Dept: RADIOLOGY | Facility: MEDICAL CENTER | Age: 49
End: 2019-11-07
Attending: NEUROLOGICAL SURGERY
Payer: COMMERCIAL

## 2019-11-07 DIAGNOSIS — M54.40 LOW BACK PAIN WITH SCIATICA, SCIATICA LATERALITY UNSPECIFIED, UNSPECIFIED BACK PAIN LATERALITY, UNSPECIFIED CHRONICITY: ICD-10-CM

## 2019-11-07 DIAGNOSIS — M54.50 LOW BACK PAIN, UNSPECIFIED BACK PAIN LATERALITY, UNSPECIFIED CHRONICITY, UNSPECIFIED WHETHER SCIATICA PRESENT: ICD-10-CM

## 2019-11-07 PROCEDURE — 72148 MRI LUMBAR SPINE W/O DYE: CPT

## 2019-11-07 PROCEDURE — 72110 X-RAY EXAM L-2 SPINE 4/>VWS: CPT

## 2020-01-16 ENCOUNTER — OFFICE VISIT (OUTPATIENT)
Dept: PHYSICAL MEDICINE AND REHAB | Facility: MEDICAL CENTER | Age: 50
End: 2020-01-16
Payer: COMMERCIAL

## 2020-01-16 VITALS
HEIGHT: 72 IN | WEIGHT: 275.35 LBS | OXYGEN SATURATION: 94 % | DIASTOLIC BLOOD PRESSURE: 80 MMHG | HEART RATE: 78 BPM | SYSTOLIC BLOOD PRESSURE: 114 MMHG | BODY MASS INDEX: 37.3 KG/M2 | TEMPERATURE: 97.3 F

## 2020-01-16 DIAGNOSIS — M47.816 LUMBAR SPONDYLOSIS: ICD-10-CM

## 2020-01-16 DIAGNOSIS — M25.852 HIP IMPINGEMENT SYNDROME, LEFT: ICD-10-CM

## 2020-01-16 DIAGNOSIS — M25.552 CHRONIC LEFT HIP PAIN: ICD-10-CM

## 2020-01-16 DIAGNOSIS — G89.29 CHRONIC LEFT HIP PAIN: ICD-10-CM

## 2020-01-16 DIAGNOSIS — M54.50 CHRONIC BILATERAL LOW BACK PAIN WITHOUT SCIATICA: ICD-10-CM

## 2020-01-16 DIAGNOSIS — E66.9 OBESITY (BMI 35.0-39.9 WITHOUT COMORBIDITY): ICD-10-CM

## 2020-01-16 DIAGNOSIS — G89.29 CHRONIC BILATERAL LOW BACK PAIN WITHOUT SCIATICA: ICD-10-CM

## 2020-01-16 DIAGNOSIS — E66.9 OBESITY (BMI 30-39.9): ICD-10-CM

## 2020-01-16 PROCEDURE — 99205 OFFICE O/P NEW HI 60 MIN: CPT | Performed by: PHYSICAL MEDICINE & REHABILITATION

## 2020-01-16 RX ORDER — IBUPROFEN 200 MG
800 TABLET ORAL EVERY 6 HOURS PRN
Status: ON HOLD | COMMUNITY
End: 2020-06-12

## 2020-01-16 ASSESSMENT — PATIENT HEALTH QUESTIONNAIRE - PHQ9: CLINICAL INTERPRETATION OF PHQ2 SCORE: 0

## 2020-01-16 ASSESSMENT — PAIN SCALES - GENERAL: PAINLEVEL: 9=SEVERE PAIN

## 2020-01-16 NOTE — Clinical Note
Dear Leonor Jaffe, A.P.N. , Thank you for the referral of Harlan Medina.  Please see my note for more details Should you have any questions or concerns please do not hesitate to contact me. Donald Long M.D.

## 2020-01-16 NOTE — PROGRESS NOTES
New patient note    Physiatry (physical medicine and  Rehabilitation), interventional spine and sports medicine    Date of service: See epic    Chief complaint:   Chief Complaint   Patient presents with   • New Patient     Back pain        HISTORY    HPI: Harlan Medina 49 y.o. male who presents today with Diagnoses of Chronic bilateral low back pain without sciatica, however the majority the patient's back pain is likely coming from his hip, Lumbar spondylosis however the majority the patient's back pain is likely coming from his hip, Hip impingement syndrome, left, Obesity (BMI 30-39.9), Obesity (BMI 35.0-39.9 without comorbidity) ([facility]), and Chronic left hip pain were pertinent to this visit.    HPI    Chronic left mostly anterior hip pain and some posterior hip pain and low back pain, aching in quality, worse with hip movements, 7/10 in intensity, nonradiating.  Failed conservative treatment with a home exercise program, with NSAIDs 800 mg ibuprofen every 6 hours as needed.       Medical records review:  I reviewed the note from ERMA Porter 10/14/2019.  Controlled type 2 diabetes.  A1c 7.4.  Chronic low back pain, reports back injections with physiatry but these were not helpful.      ROS:   Red Flags ROS:   Fever, Chills, Sweats: Denies  Involuntary Weight Loss: Denies  Bladder Incontinence: Denies  Bowel Incontinence: denies  Saddle Anesthesia: Denies    All other systems reviewed and negative.       PMHx:   Past Medical History:   Diagnosis Date   • LLQ abdominal pain 3/8/2014    Intermittent constipation x 1 year Will take otc meds to make him go No hematuria, blood in stool Not peeing; weak, hesitant stream at times, not much urine Feels need to move bowels, then can't  No large/hard or small/pebble - when he does go it's mushy and not much Prior to a year ago - normal 2/day BM No fam h/o colon ca   • Tobacco dependence 1/14/2016         Current Outpatient Medications on File Prior to  Visit   Medication Sig Dispense Refill   • ibuprofen (MOTRIN) 200 MG Tab Take 800 mg by mouth every 6 hours as needed.     • metFORMIN ER (GLUCOPHAGE XR) 500 MG TABLET SR 24 HR Take 1 Tab by mouth every day. At night with dinner 30 Tab 5     No current facility-administered medications on file prior to visit.         PSHx:   Past Surgical History:   Procedure Laterality Date   • APPENDECTOMY      2006       Family history   Family History   Problem Relation Age of Onset   • Hypertension Father    • Diabetes Father    • Lung Disease Neg Hx    • Cancer Neg Hx    • Heart Disease Neg Hx    • Stroke Neg Hx          Medications: reviewed on epic.   Outpatient Medications Marked as Taking for the 1/16/20 encounter (Office Visit) with Donald Long M.D.   Medication Sig Dispense Refill   • ibuprofen (MOTRIN) 200 MG Tab Take 800 mg by mouth every 6 hours as needed.     • metFORMIN ER (GLUCOPHAGE XR) 500 MG TABLET SR 24 HR Take 1 Tab by mouth every day. At night with dinner 30 Tab 5        Allergies:   No Known Allergies    Social Hx:   Social History     Socioeconomic History   • Marital status:      Spouse name: Not on file   • Number of children: Not on file   • Years of education: Not on file   • Highest education level: Not on file   Occupational History   • Not on file   Social Needs   • Financial resource strain: Not on file   • Food insecurity:     Worry: Not on file     Inability: Not on file   • Transportation needs:     Medical: Not on file     Non-medical: Not on file   Tobacco Use   • Smoking status: Never Smoker   • Smokeless tobacco: Current User     Types: Chew   • Tobacco comment: one can a day    Substance and Sexual Activity   • Alcohol use: Yes     Alcohol/week: 0.0 oz     Comment: socially   • Drug use: No   • Sexual activity: Yes     Partners: Female   Lifestyle   • Physical activity:     Days per week: Not on file     Minutes per session: Not on file   • Stress: Not on file   Relationships   •  Social connections:     Talks on phone: Not on file     Gets together: Not on file     Attends [employer] service: Not on file     Active member of club or organization: Not on file     Attends meetings of clubs or organizations: Not on file     Relationship status: Not on file   • Intimate partner violence:     Fear of current or ex partner: Not on file     Emotionally abused: Not on file     Physically abused: Not on file     Forced sexual activity: Not on file   Other Topics Concern   •  Service No   • Blood Transfusions No   • Caffeine Concern No   • Occupational Exposure No   • Hobby Hazards No   • Sleep Concern No   • Stress Concern No   • Weight Concern Yes   • Special Diet No   • Back Care No   • Exercise Yes   • Bike Helmet No   • Seat Belt No   • Self-Exams No   Social History Narrative   • Not on file         EXAMINATION     Physical Exam:   Vitals: /80 (BP Location: Right arm, Patient Position: Sitting, BP Cuff Size: Adult)   Pulse 78   Temp 36.3 °C (97.3 °F) (Temporal)   Ht 1.829 m (6')   Wt 124.9 kg (275 lb 5.7 oz)   SpO2 94%     Constitutional:   Body Habitus: Body mass index is 37.34 kg/m².  Cooperation: Fully cooperates with exam  Appearance: Well-groomed, well-nourished, not disheveled     Eyes: No scleral icterus to suggest severe liver disease, no proptosis to suggest severe hyperthyroid    ENT -no obvious auditory deficits, no obvious tongue lesions, tongue midline, no facial droop     Skin -no rashes or lesions noted     Respiratory-  breathing comfortable on room air, no audible wheezing    Cardiovascular- capillary refills less than 2 seconds. No lower extremity edema is noted.     Gastrointestinal - no obvious abdominal masses, No tenderness to palpation in the abdomen    Psychiatric- alert and oriented ×3. Normal affect.     Gait - normal gait, no use of ambulatory device, nonantalgic. the patient can toe walk with ease. the patient can heel walk with ease. the patient can  tandem walk with ease. balance is appropriate..     Musculoskeletal -   Thoracic/Lumbar Spine/Sacral Spine/Hips   Inspection: No evidence of atrophy in bilateral lower extremities throughout     ROM: full  active range of motion with flexion, lateral flexion, and rotation bilaterally.   There is full  active range of motion with lumbar extension.    There is pain with lumbar extension.   There is pain with facet loading maneuver (extension rotation) negative bilaterally    Palpation:   No tenderness to palpation in midline at T1-T12 levels. No tenderness to palpation in the left and right of the midline T1-L5, NEGATIVE for tenderness to palpation to the para-midline region in the lower lumbar levels.  palpation over SI joint: negative bilaterally    palpation over buttock: negative bilaterally    palpation in hip or over the greater trochanters: negative bilaterally      Lumbar spine Special tests  Neuro tension  Straight leg test negative bilaterally    Slump test negative bilaterally      HIP  FAIR test negative right, positive left    Decreased range of motion of the bilateral hips especially with internal rotation left worse than right    SI joint tests  Observation patient sits on one buttocks: Negative  SI joint compression negative bilaterally    SI joint distraction negative bilaterally    Thigh thrust test negative bilaterally    ASHA test negative bilaterally       Neuro       Key points for the international standards for neurological classification of spinal cord injury (ISNCSCI) to light touch.     Dermatome R L                                       L2 2 2   L3 2 2   L4 2 2   L5 2 2   S1 2 2   S2 2 2           Motor Exam Lower Extremities    ? Myotome R L   Hip flexion L2 5 5   Knee extension L3 5 5   Ankle dorsiflexion L4 5 5   Toe extension L5 5 5   Ankle plantarflexion S1 5 5        Babinski sign negative bilaterally   Clonus of the ankle negative bilaterally     Reflexes  ?  R L                 Patella  1+ 1+   Achilles   1+ 1+           MEDICAL DECISION MAKING    Medical records review: see under HPI section.     DATA    Labs:   Lab Results   Component Value Date/Time    SODIUM 138 04/16/2018 07:30 AM    POTASSIUM 4.4 04/16/2018 07:30 AM    CHLORIDE 105 04/16/2018 07:30 AM    CO2 26 04/16/2018 07:30 AM    ANION 7.0 04/16/2018 07:30 AM    GLUCOSE 146 (H) 04/16/2018 07:30 AM    BUN 14 04/16/2018 07:30 AM    CREATININE 1.10 04/16/2018 07:30 AM    CALCIUM 9.3 04/16/2018 07:30 AM    ASTSGOT 25 04/16/2018 07:30 AM    ALTSGPT 41 04/16/2018 07:30 AM    TBILIRUBIN 0.4 04/16/2018 07:30 AM    ALBUMIN 4.2 04/16/2018 07:30 AM    TOTPROTEIN 6.6 04/16/2018 07:30 AM    GLOBULIN 2.4 04/16/2018 07:30 AM    AGRATIO 1.8 04/16/2018 07:30 AM   ]    No results found for: PROTHROMBTM, INR     Lab Results   Component Value Date/Time    WBC 5.6 07/09/2019 10:32 PM    RBC 5.18 07/09/2019 10:32 PM    HEMOGLOBIN 15.8 07/09/2019 10:32 PM    HEMATOCRIT 47.3 07/09/2019 10:32 PM    MCV 91.3 07/09/2019 10:32 PM    MCH 30.5 07/09/2019 10:32 PM    MCHC 33.4 (L) 07/09/2019 10:32 PM    MPV 12.9 07/09/2019 10:32 PM    NEUTSPOLYS 66.80 07/09/2019 10:32 PM    LYMPHOCYTES 22.90 07/09/2019 10:32 PM    MONOCYTES 7.30 07/09/2019 10:32 PM    EOSINOPHILS 2.10 07/09/2019 10:32 PM    BASOPHILS 0.70 07/09/2019 10:32 PM        Lab Results   Component Value Date/Time    HBA1C 6.9 (A) 10/14/2019 08:04 AM        Imaging:   I personally reviewed following images, these are my reads  MRI lumbar spine 11/7/2019  Mild left neuroforaminal stenosis L4-5 with small disc protrusion.  Facet arthropathy is present bilaterally L4-5 and L5-S1.  No significant central canal stenosis at any level.  No acute fractures.    IMAGING radiology reads. I reviewed the following radiology reads                Results for orders placed during the hospital encounter of 11/07/19   MR-LUMBAR SPINE-W/O    Impression 1.  Stable mild lumbar spondylosis as detailed including small  posterior central disc protrusion at L4-L5.  2.  No significant final or foraminal stenosis.                                                       MRI left hip 3/8/2018  BONES, BONE MARROW, and CARTILAGE: There is minimal convexity at the lateral femoral head/neck junction. This morphologic abnormality can be associated with the clinical syndrome of femoroacetabular impingement.     1.  Intact acetabular labrum and articular cartilage     2.  Minimal convexity at the lateral femoral head/neck junction which can be associated with the clinical syndrome of femoroacetabular impingement                                                              Results for orders placed during the hospital encounter of 11/07/19   DX-LUMBAR SPINE-4+ VIEWS    Impression Mild multilevel degenerative disc disease and facet degeneration.                                         Diagnosis   Visit Diagnoses     ICD-10-CM   1. Chronic bilateral low back pain without sciatica, however the majority the patient's back pain is likely coming from his hip M54.5    G89.29   2. Lumbar spondylosis however the majority the patient's back pain is likely coming from his hip M47.816   3. Hip impingement syndrome, left M25.852   4. Obesity (BMI 30-39.9) E66.9   5. Obesity (BMI 35.0-39.9 without comorbidity) ([facility]) E66.9   6. Chronic left hip pain M25.552    G89.29           ASSESSMENT AND PLAN:  Harlan Turner Jr Medina 49 y.o. male      Harlan was seen today for new patient.    Diagnoses and all orders for this visit:    Chronic bilateral low back pain without sciatica, however the majority the patient's back pain is likely coming from his hip    Lumbar spondylosis however the majority the patient's back pain is likely coming from his hip    Hip impingement syndrome, left  -     REFERRAL TO PHYSICAL MEDICINE REHAB    Obesity (BMI 30-39.9)  -     REFERRAL TO [facility] IMPROVEMENT PROGRAMS (HIP) Services Requested: Medical Weight Management Program; Reason  for Visit: Change in Treatment Regimen, Overweight/Obesity    Obesity (BMI 35.0-39.9 without comorbidity) ([facility])  -     REFERRAL TO [facility] (HIP) Services Requested: Medical Weight Management Program; Reason for Visit: Change in Treatment Regimen, Overweight/Obesity    Chronic left hip pain  -     REFERRAL TO PHYSICAL MEDICINE REHAB        While the patient does have a tiny disc protrusion at L4-5 there is no neuroforaminal stenosis and this does not correlate with the patient's symptoms.  He does have some facet arthropathy but no findings clinically.  I believe the majority the patient's symptoms are coming from his left hip with hip impingement syndrome which is consistent with exam and his history.  He is failed conservative treatments with a home exercise program and with medication management continues to have symptoms.    I have ordered a left hip intra-articular injection with fluoroscopic guidance for diagnostic and therapeutic purposes.  We discussed the risk, benefits and alternatives of this procedure.      Follow-up: After the above diagnostic studies         Please note that this dictation was created using voice recognition software. I have made every reasonable attempt to correct obvious errors but there may be errors of grammar and content that I may have overlooked prior to finalization of this note.      Donald Long MD  Physical Medicine and Rehabilitation  Interventional Spine and Sports Physiatry  [facility]               CC ERMA Porter   CC Sergey Cespedes MD.

## 2020-01-16 NOTE — PATIENT INSTRUCTIONS
Your procedure will be at the [facility] special procedure suite.    [facility] [place] [address]       PRE-PROCEDURE INSTRUCTIONS  You may take your regular medications except:   · No Anti-inflammatories 5 days prior to your procedure. Anti-inflammatories include medicines such as  ibuprofen (Motrin, Advil), Excedrin, Naproxen (Aleve, Anaprox, Naprelan, Naprosyn), Celecoxib (Celebrex), Diclofenac (Voltaren-XR tab), and Meloxicam (Mobic).   · No Glucophage or Metformin 24 hours before your procedure. You may resume next day after your procedure.  · Call the physiatry office if you are taking or prescribed anti-biotics within five days of procedure.  · Please ask provider if you are taking any new diabetes medication.  · CONTINUE TAKING BLOOD PRESSURE MEDICATIONS AS PRESCRIBED.  · Pain medications will not be prescribed on the procedure day. Procedural pain medication may be used by your provider   · Call your doctor's office performing the procedure if you have a fever, chills, rash or new illness prior to your procedure    Anticoagulation/antiplatelet medications  No Blood thinning medications such as Coumadin or Plavix 5 days prior to procedure unless your doctor said to continue these medications. Call your doctor if a new medication is prescribed in this class.     Restrictions for eating before procedure:   · If you are getting procedural sedation, then do not eat to for 8 hours prior to procedure appointment time. Do not drink fluids for four hours prior to your procedure time.   · If you are not having procedural sedation, then Skip the meal prior to your procedure. If you have a morning procedure then skip breakfast. If you have an afternoon procedure then skip lunch.   · You may drink clear liquids up to 2 hours prior to your procedure  · You must have a  the day of procedure to accompany you home.      POST PROCEDURE INSTRUCTIONS   · No heavy lifting, strenuous bending or  strenuous exercise for 3 days after your procedure.  · No hot tubs, baths, swimming for 3 days after your procedure  · You can remove the bandage the day after the procedure.  · IF YOU RECEIVED A STEROID INJECTION. PLEASE NOTE THAT THERE MAY BE A DELAY FOR THE INJECTION TO START WORKING, THE DELAY MAY BE UP TO TWO WEEKS. IF YOU HAVE DIABETES, PLEASE NOTE THAT YOUR SUGAR LEVELS MAY BE ELEVATED FOR 1-2 DAYS AFTER A STEROID INJECTION.  THE STEROID MAY CAUSE TEMPORARY SYMPTOMS WHICH USUALLY RESOLVE ON THEIR OWN WITHIN 1 TO 2 DAYS INCLUDING FACIAL FLUSHING OR A FEELING OF WARMTH ON THE FACE, TEMPORARY INCREASES IN BLOOD SUGAR, INSOMNIA, INCREASED HUNGER   IF YOU RECEIVED A DIAGNOSTIC PROCEDURE (SUCH AS A MEDIAL BRANCH BLOCK), PLEASE NOTE THAT WE DO EXPECT THIS INJECTION TO WEAR OFF.  IT IS IMPORTANT TO COMPLETE THE PAIN DIARY AND LIST THE PAIN SCORE ONLY FOR THE REGION WHERE THE PROCEDURE WAS AND BRING THIS TO YOUR FOLLOW UP VISIT.  · IF YOU EXPERIENCE PROLONGED WEAKNESS LONGER THAN ONE DAY, BOWEL OR BLADDER INCONTINENCE THEN PLEASE CALL THE PHYSIATRY OFFICE.  · Your leg may feel heavy, weak and numb for up to 1-2 days. Be very careful walking.   ·  You may resume normal activities 3 days after procedure.

## 2020-01-20 ENCOUNTER — HOSPITAL ENCOUNTER (OUTPATIENT)
Dept: RADIOLOGY | Facility: REHABILITATION | Age: 50
End: 2020-01-20
Attending: PHYSICAL MEDICINE & REHABILITATION

## 2020-01-20 ENCOUNTER — HOSPITAL ENCOUNTER (OUTPATIENT)
Dept: PAIN MANAGEMENT | Facility: REHABILITATION | Age: 50
End: 2020-01-20
Attending: PHYSICAL MEDICINE & REHABILITATION
Payer: COMMERCIAL

## 2020-01-20 VITALS
RESPIRATION RATE: 15 BRPM | SYSTOLIC BLOOD PRESSURE: 134 MMHG | HEIGHT: 72 IN | DIASTOLIC BLOOD PRESSURE: 68 MMHG | WEIGHT: 274.03 LBS | OXYGEN SATURATION: 93 % | TEMPERATURE: 98.1 F | BODY MASS INDEX: 37.12 KG/M2 | HEART RATE: 56 BPM

## 2020-01-20 PROCEDURE — 700117 HCHG RX CONTRAST REV CODE 255

## 2020-01-20 PROCEDURE — 20610 DRAIN/INJ JOINT/BURSA W/O US: CPT

## 2020-01-20 PROCEDURE — 700111 HCHG RX REV CODE 636 W/ 250 OVERRIDE (IP)

## 2020-01-20 RX ORDER — DEXAMETHASONE SODIUM PHOSPHATE 10 MG/ML
INJECTION, SOLUTION INTRAMUSCULAR; INTRAVENOUS
Status: COMPLETED
Start: 2020-01-20 | End: 2020-01-20

## 2020-01-20 RX ORDER — LIDOCAINE HYDROCHLORIDE 10 MG/ML
INJECTION, SOLUTION EPIDURAL; INFILTRATION; INTRACAUDAL; PERINEURAL
Status: COMPLETED
Start: 2020-01-20 | End: 2020-01-20

## 2020-01-20 RX ADMIN — DEXAMETHASONE SODIUM PHOSPHATE 10 MG: 10 INJECTION, SOLUTION INTRAMUSCULAR; INTRAVENOUS at 08:16

## 2020-01-20 RX ADMIN — LIDOCAINE HYDROCHLORIDE 10 ML: 10 INJECTION, SOLUTION EPIDURAL; INFILTRATION; INTRACAUDAL; PERINEURAL at 08:14

## 2020-01-20 RX ADMIN — IOHEXOL 1 ML: 240 INJECTION, SOLUTION INTRATHECAL; INTRAVASCULAR; INTRAVENOUS; ORAL at 08:15

## 2020-01-20 NOTE — PROCEDURES
Date of Service: 1/20/2020     Patient: Harlan Medina 49 y.o. male     MRN: [id number]     Physician/s: Donald Long MD    Pre-operative Diagnosis: left  hip osteoarthritis    Post-operative Diagnosis: left hip osteoarthritis    Procedure: left diagnostic and  therapeutic intra-articular Hip injection with fluoroscopic guidance    Description of procedure:    The risks, benefits, and alternatives of the procedure were reviewed and discussed with the patient.  Written informed consent was freely obtained. A pre-procedural time-out was conducted by the physician verifying patient’s identity, procedure to be performed, procedure site and side, and allergy verification. Appropriate equipment was determined to be in place for the procedure.     The femoral artery nerve and vein as well as the inguinal ligament were identified with ultrasound and marked with a marking pen.  The entire procedure took place lateral to the femoral vessels and nerve and inferior to the inguinal ligament.    In the procedure suite the patient was placed in a supine position with and the skin was prepped and draped in the usual sterile fashion. A 27-gauge 1.5 inch needle was used with approximately 2 mL of 1% lidocaine for local anesthesia at the junction of the femoral head and neck on the LEFT.  A 25g 3.5 inch needle was placed into skin and advanced under fluoroscopic guidance into the joint space. 4 mL of contrast was used to clearly demonstrate the outlining of the joint capsule. Following negative aspiration, approx 5cc of 1% lidocaine with 4mg/mL dexamethasone was then injected into the joint, and the needle was subsequently removed intact after restyleted. The patient's hip was wiped with a 4x4 gauze, the area was cleansed with alcohol prep, and a bandaid was applied. There were no complications noted.       Preprocedural pain: 8/10 with FAIRs maneuver  Postprocedural pain: 0/10 with FAIRs maneuver      Donald Long  MD  Physical Medicine and Rehabilitation  Interventional Spine and Sports Physiatry  [facility]          CPT  Major joint/bursa:  20610  Fluoroscopic  needle guidance [id number]

## 2020-01-20 NOTE — NON-PROVIDER
Current meds. See medication reconciliation form. Reviewed with pt. Pt denies taking ASA,other blood thinners or anti-inflammatories. Pre-procedure assessment completed and information  communicated to procedure room RN during handoff.(pt is pre-diabetic and doesn't check blood sugars at home) Pt has a ride post-procedure (wife, Yunior is ). Printed and verbal discharge instructions as well as education regarding infection prevention given to pt/pt's family who verbalized understanding.

## 2020-01-20 NOTE — NON-PROVIDER
Post procedure confirmed.  Escorted  to recovery room ambulatory without difficulty.Hand off given to DEMETRICE Lozano RN.

## 2020-01-20 NOTE — NON-PROVIDER
Pre-  procedure nursing  checklist and assessment done. Preparatory pre- procedure education given and understood by patient.Consent signed and H&P updated.Identified patient , procedure  and site confirmed, medication allergies, pertinent medical history ( controlled type 2 DM stop bang score  3 ), significant patient information ( not checking blood sugar ).  Site marked by Dr. Long  . Positioned patient by CST,RN, X - ray Tech.

## 2020-01-21 ENCOUNTER — TELEPHONE (OUTPATIENT)
Dept: PHYSICAL MEDICINE AND REHAB | Facility: MEDICAL CENTER | Age: 50
End: 2020-01-21

## 2020-01-21 NOTE — TELEPHONE ENCOUNTER
Spoke to Pt in regards to his SP that was done with Dr. Long dated 1/20/2020 for his Diagnostic and therapeutic Fluoroscopically guided intra-articular LEFT hip injection and he mentioned that he is fine.    Thank you

## 2020-01-31 DIAGNOSIS — E11.9 CONTROLLED TYPE 2 DIABETES MELLITUS WITHOUT COMPLICATION, WITHOUT LONG-TERM CURRENT USE OF INSULIN (HCC): ICD-10-CM

## 2020-01-31 RX ORDER — METFORMIN HYDROCHLORIDE 500 MG/1
TABLET, EXTENDED RELEASE ORAL
Qty: 30 TAB | Refills: 4 | Status: SHIPPED | OUTPATIENT
Start: 2020-01-31 | End: 2020-09-11 | Stop reason: SDUPTHER

## 2020-01-31 NOTE — TELEPHONE ENCOUNTER
Received request via: Pharmacy    Was the patient seen in the last year in this department? Yes    Does the patient have an active prescription (recently filled or refills available) for medication(s) requested? No     metFORMIN HCL  MG TABLET

## 2020-02-10 ENCOUNTER — PATIENT MESSAGE (OUTPATIENT)
Dept: HEALTH INFORMATION MANAGEMENT | Facility: OTHER | Age: 50
End: 2020-02-10

## 2020-02-27 ENCOUNTER — OFFICE VISIT (OUTPATIENT)
Dept: PHYSICAL MEDICINE AND REHAB | Facility: MEDICAL CENTER | Age: 50
End: 2020-02-27
Payer: COMMERCIAL

## 2020-02-27 VITALS
SYSTOLIC BLOOD PRESSURE: 118 MMHG | WEIGHT: 270.73 LBS | OXYGEN SATURATION: 95 % | TEMPERATURE: 97.5 F | DIASTOLIC BLOOD PRESSURE: 72 MMHG | HEART RATE: 58 BPM | BODY MASS INDEX: 36.67 KG/M2 | HEIGHT: 72 IN

## 2020-02-27 DIAGNOSIS — E11.9 CONTROLLED TYPE 2 DIABETES MELLITUS WITHOUT COMPLICATION, WITHOUT LONG-TERM CURRENT USE OF INSULIN (HCC): ICD-10-CM

## 2020-02-27 DIAGNOSIS — G89.29 CHRONIC BILATERAL LOW BACK PAIN WITHOUT SCIATICA: ICD-10-CM

## 2020-02-27 DIAGNOSIS — M54.50 CHRONIC BILATERAL LOW BACK PAIN WITHOUT SCIATICA: ICD-10-CM

## 2020-02-27 DIAGNOSIS — M16.10 ARTHRITIS OF HIP: ICD-10-CM

## 2020-02-27 DIAGNOSIS — G89.29 CHRONIC LEFT HIP PAIN: ICD-10-CM

## 2020-02-27 DIAGNOSIS — M25.852 HIP IMPINGEMENT SYNDROME, LEFT: ICD-10-CM

## 2020-02-27 DIAGNOSIS — M25.552 CHRONIC LEFT HIP PAIN: ICD-10-CM

## 2020-02-27 DIAGNOSIS — M47.816 LUMBAR SPONDYLOSIS: ICD-10-CM

## 2020-02-27 PROCEDURE — 99214 OFFICE O/P EST MOD 30 MIN: CPT | Performed by: PHYSICAL MEDICINE & REHABILITATION

## 2020-02-27 ASSESSMENT — PATIENT HEALTH QUESTIONNAIRE - PHQ9: CLINICAL INTERPRETATION OF PHQ2 SCORE: 0

## 2020-02-27 ASSESSMENT — PAIN SCALES - GENERAL: PAINLEVEL: NO PAIN

## 2020-02-27 NOTE — PROGRESS NOTES
Follow up patient note  Interventional spine and sports physiatry, Physical medicine rehabilitation      Chief complaint:   Chief Complaint   Patient presents with   • Follow-Up     Hip pain          HISTORY    Please see new patient note by Dr Long,  for more details.     HPI  Patient identification: Harlan Medina 50 y.o. male  With Diagnoses of Hip impingement syndrome, left, Chronic left hip pain, Arthritis of hip, Chronic bilateral low back pain without sciatica, however the majority the patient's back pain is likely coming from his hip, Lumbar spondylosis however the majority the patient's back pain is likely coming from his hip, and Controlled type 2 diabetes mellitus without complication, without long-term current use of insulin ([facility]) were pertinent to this visit.     Procedures:  1/20/2020 diagnostic and therapeutic left intra-articular hip injection with fluoroscopic guidance    Status post the above procedure the patient has had near complete resolution of the pain in his left hip.  Significant functional improvement with unlimited ability for standing and walking now.  Pain is now rare 1 out of 10 in intensity aching quality nonradiating in the anterior groin.  However this is significantly improved and he has no functional limitations at this point.  He has been decreasing his use of ibuprofen now the pain is been improving as well.  Also after the above procedure his back pain has resolved.       ROS Red Flags :   Fever, Chills, Sweats: Denies  Involuntary Weight Loss: Denies  Bowel/Bladder Incontinence: Denies  Saddle Anesthesia: Denies        PMHx:   Past Medical History:   Diagnosis Date   • LLQ abdominal pain 3/8/2014    Intermittent constipation x 1 year Will take otc meds to make him go No hematuria, blood in stool Not peeing; weak, hesitant stream at times, not much urine Feels need to move bowels, then can't  No large/hard or small/pebble - when he does go it's mushy and not much  Prior to a year ago - normal 2/day BM No fam h/o colon ca   • Tobacco dependence 1/14/2016       PSHx:   Past Surgical History:   Procedure Laterality Date   • APPENDECTOMY      2006       Family history   Family History   Problem Relation Age of Onset   • Hypertension Father    • Diabetes Father    • Lung Disease Neg Hx    • Cancer Neg Hx    • Heart Disease Neg Hx    • Stroke Neg Hx          Medications:   Outpatient Medications Marked as Taking for the 2/27/20 encounter (Office Visit) with Donald Long M.D.   Medication Sig Dispense Refill   • metFORMIN ER (GLUCOPHAGE XR) 500 MG TABLET SR 24 HR TAKE ONE TABLET BY MOUTH EVERY EVENING WITH DINNER 30 Tab 4   • ibuprofen (MOTRIN) 200 MG Tab Take 800 mg by mouth every 6 hours as needed.          Current Outpatient Medications on File Prior to Visit   Medication Sig Dispense Refill   • metFORMIN ER (GLUCOPHAGE XR) 500 MG TABLET SR 24 HR TAKE ONE TABLET BY MOUTH EVERY EVENING WITH DINNER 30 Tab 4   • ibuprofen (MOTRIN) 200 MG Tab Take 800 mg by mouth every 6 hours as needed.       No current facility-administered medications on file prior to visit.          Allergies:   No Known Allergies    Social Hx:   Social History     Socioeconomic History   • Marital status:      Spouse name: Not on file   • Number of children: Not on file   • Years of education: Not on file   • Highest education level: Not on file   Occupational History   • Not on file   Social Needs   • Financial resource strain: Not on file   • Food insecurity     Worry: Not on file     Inability: Not on file   • Transportation needs     Medical: Not on file     Non-medical: Not on file   Tobacco Use   • Smoking status: Never Smoker   • Smokeless tobacco: Current User     Types: Chew   • Tobacco comment: one can a day    Substance and Sexual Activity   • Alcohol use: Yes     Alcohol/week: 0.0 oz     Comment: socially   • Drug use: No   • Sexual activity: Yes     Partners: Female   Lifestyle   •  Physical activity     Days per week: Not on file     Minutes per session: Not on file   • Stress: Not on file   Relationships   • Social connections     Talks on phone: Not on file     Gets together: Not on file     Attends [employer] service: Not on file     Active member of club or organization: Not on file     Attends meetings of clubs or organizations: Not on file     Relationship status: Not on file   • Intimate partner violence     Fear of current or ex partner: Not on file     Emotionally abused: Not on file     Physically abused: Not on file     Forced sexual activity: Not on file   Other Topics Concern   •  Service No   • Blood Transfusions No   • Caffeine Concern No   • Occupational Exposure No   • Hobby Hazards No   • Sleep Concern No   • Stress Concern No   • Weight Concern Yes   • Special Diet No   • Back Care No   • Exercise Yes   • Bike Helmet No   • Seat Belt No   • Self-Exams No   Social History Narrative   • Not on file         EXAMINATION     Physical Exam:   Vitals: /72 (BP Location: Right arm, Patient Position: Sitting, BP Cuff Size: Adult)   Pulse (!) 58   Temp 36.4 °C (97.5 °F) (Temporal)   Ht 1.829 m (6')   Wt 122.8 kg (270 lb 11.6 oz)   SpO2 95%     Constitutional:   Body Habitus: Body mass index is 36.72 kg/m².  Cooperation: Fully cooperates with exam  Appearance: Well-groomed no disheveled    Respiratory-  breathing comfortable on room air, no audible wheezing  Cardiovascular- capillary refills less than 2 seconds. No lower extremity edema is noted.   Psychiatric- alert and oriented ×3. Normal affect.    MSK and Neuro: -  There are no signs of infection around the injection sites.   Mildly limited range of motion of the left hip however FAIRs maneuver and Carline's maneuver are negative bilaterally.  full  active range of motion with flexion, lateral flexion, and rotation bilaterally.   There is full  active range of motion with lumbar extension.      Palpation:   No  tenderness to palpation in midline at T1-T12 levels. No tenderness to palpation in the left and right of the midline T1-L5, NEGATIVE for tenderness to palpation to the para-midline region in the lower lumbar levels.  palpation over SI joint: negative bilaterally    palpation in hip or over the greater trochanters: negative bilaterally      Lumbar spine Special tests  Neuro tension  Straight leg test negative bilaterally    Slump test negative bilaterally      Key points for the international standards for neurological classification of spinal cord injury (ISNCSCI) to light touch.     Dermatome R L                                      L2 2 2   L3 2 2   L4 2 2   L5 2 2   S1 2 2   S2 2 2         Motor Exam Lower Extremities    ? Myotome R L   Hip flexion L2 5 5   Knee extension L3 5 5   Ankle dorsiflexion L4 5 5   Toe extension L5 5 5   Ankle plantarflexion S1 5 5                 MEDICAL DECISION MAKING    DATA    Labs:   Lab Results   Component Value Date/Time    SODIUM 138 04/16/2018 07:30 AM    POTASSIUM 4.4 04/16/2018 07:30 AM    CHLORIDE 105 04/16/2018 07:30 AM    CO2 26 04/16/2018 07:30 AM    GLUCOSE 146 (H) 04/16/2018 07:30 AM    BUN 14 04/16/2018 07:30 AM    CREATININE 1.10 04/16/2018 07:30 AM    BUNCREATRAT 12 03/14/2014 12:00 AM        No results found for: PROTHROMBTM, INR     Lab Results   Component Value Date/Time    WBC 5.6 07/09/2019 10:32 PM    RBC 5.18 07/09/2019 10:32 PM    HEMOGLOBIN 15.8 07/09/2019 10:32 PM    HEMATOCRIT 47.3 07/09/2019 10:32 PM    MCV 91.3 07/09/2019 10:32 PM    MCH 30.5 07/09/2019 10:32 PM    MCHC 33.4 (L) 07/09/2019 10:32 PM    MPV 12.9 07/09/2019 10:32 PM    NEUTSPOLYS 66.80 07/09/2019 10:32 PM    LYMPHOCYTES 22.90 07/09/2019 10:32 PM    MONOCYTES 7.30 07/09/2019 10:32 PM    EOSINOPHILS 2.10 07/09/2019 10:32 PM    BASOPHILS 0.70 07/09/2019 10:32 PM        Lab Results   Component Value Date/Time    HBA1C 6.9 (A) 10/14/2019 08:04 AM          Imaging:   I personally reviewed  following images    Fluoroscopic images from 1/20/2020 showing successful left hip injection    I reviewed the following radiology reports                                        Results for orders placed during the hospital encounter of 11/07/19   MR-LUMBAR SPINE-W/O    Impression 1.  Stable mild lumbar spondylosis as detailed including small posterior central disc protrusion at L4-L5.  2.  No significant final or foraminal stenosis.                  Results for orders placed during the hospital encounter of 11/07/19   MR-LUMBAR SPINE-W/O    Impression 1.  Stable mild lumbar spondylosis as detailed including small posterior central disc protrusion at L4-L5.  2.  No significant final or foraminal stenosis.                                                                                                                             Results for orders placed during the hospital encounter of 05/03/18   CT-ABDOMEN-PELVIS WITH    Impression 1.  Diverticulosis without evidence of diverticulitis.    2.  Fatty infiltration of the liver.    3.  Small low-attenuation lesions in the kidneys which are too small to clearly characterize though likely represent cysts.                                                                                 Results for orders placed during the hospital encounter of 11/07/19   DX-LUMBAR SPINE-4+ VIEWS    Impression Mild multilevel degenerative disc disease and facet degeneration.                                         DIAGNOSIS   Visit Diagnoses     ICD-10-CM   1. Hip impingement syndrome, left M25.852   2. Chronic left hip pain M25.552    G89.29   3. Arthritis of hip M16.10   4. Chronic bilateral low back pain without sciatica, however the majority the patient's back pain is likely coming from his hip M54.5    G89.29   5. Lumbar spondylosis however the majority the patient's back pain is likely coming from his hip M47.816   6. Controlled type 2 diabetes mellitus without complication, without  long-term current use of insulin (HCC) E11.9         ASSESSMENT and PLAN:     Harlan Medina 50 y.o. male      Harlan was seen today for follow-up.    Diagnoses and all orders for this visit:    Hip impingement syndrome, left  -     REFERRAL TO PHYSICAL THERAPY Reason for Therapy: Eval/Treat/Report    Chronic left hip pain  -     REFERRAL TO PHYSICAL THERAPY Reason for Therapy: Eval/Treat/Report    Arthritis of hip  -     REFERRAL TO PHYSICAL THERAPY Reason for Therapy: Eval/Treat/Report    Chronic bilateral low back pain without sciatica, however the majority the patient's back pain is likely coming from his hip    Lumbar spondylosis however the majority the patient's back pain is likely coming from his hip    Controlled type 2 diabetes mellitus without complication, without long-term current use of insulin (HCC)        Dramatic improvement in the patient's hip pain and back pain after the intra-articular left hip injection.  I believe his back pain is related to an intra-articular hip process.  Now he has near complete resolution of his pain with significant functional improvement.  I believe he would tolerate physical therapy at this time.    I counseled the patient against use of ibuprofen 800 mg on a consistent basis especially given his history of diabetes.  We discussed renal issues with higher doses of ibuprofen and NSAIDs.    Follow up: 1 year PRN    Thank you for allowing me to participate in the care of this patient. If you have any questions please not hesitate to contact me.          Please note that this dictation was created using voice recognition software. I have made every reasonable attempt to correct obvious errors but there may be errors of grammar and content that I may have overlooked prior to finalization of this note.      Donald Long MD  Interventional Spine and Sports Physiatry  Physical Medicine and Rehabilitation  [facility]

## 2020-02-27 NOTE — Clinical Note
He had near complete resolution of his pain in his hip and his back after an intra-articular hip injection.  Overall Matthias is doing fantastic at this point.

## 2020-03-23 ENCOUNTER — PATIENT MESSAGE (OUTPATIENT)
Dept: MEDICAL GROUP | Facility: LAB | Age: 50
End: 2020-03-23

## 2020-03-23 DIAGNOSIS — E11.9 CONTROLLED TYPE 2 DIABETES MELLITUS WITHOUT COMPLICATION, WITHOUT LONG-TERM CURRENT USE OF INSULIN (HCC): ICD-10-CM

## 2020-03-23 RX ORDER — METFORMIN HYDROCHLORIDE 500 MG/1
500 TABLET, EXTENDED RELEASE ORAL
Qty: 30 TAB | Refills: 4 | Status: SHIPPED | OUTPATIENT
Start: 2020-03-23 | End: 2020-04-28 | Stop reason: SDUPTHER

## 2020-03-23 NOTE — TELEPHONE ENCOUNTER
----- Message from Harlan Medina sent at 3/23/2020 12:58 PM PDT -----  Regarding: Prescription Question  Contact: [phone]  Hi I’m working in [place] can you send my prescription to [facility] on [address]. It’s my metforman. Thank you so much!!! Let me know

## 2020-03-30 ENCOUNTER — APPOINTMENT (OUTPATIENT)
Dept: HEALTH INFORMATION MANAGEMENT | Facility: MEDICAL CENTER | Age: 50
End: 2020-03-30
Payer: COMMERCIAL

## 2020-04-09 ENCOUNTER — OFFICE VISIT (OUTPATIENT)
Dept: MEDICAL GROUP | Facility: LAB | Age: 50
End: 2020-04-09
Payer: COMMERCIAL

## 2020-04-09 ENCOUNTER — OFFICE VISIT (OUTPATIENT)
Dept: PHYSICAL MEDICINE AND REHAB | Facility: MEDICAL CENTER | Age: 50
End: 2020-04-09
Payer: COMMERCIAL

## 2020-04-09 VITALS
SYSTOLIC BLOOD PRESSURE: 112 MMHG | WEIGHT: 272.49 LBS | TEMPERATURE: 97 F | DIASTOLIC BLOOD PRESSURE: 68 MMHG | OXYGEN SATURATION: 94 % | HEART RATE: 60 BPM | BODY MASS INDEX: 36.91 KG/M2 | HEIGHT: 72 IN

## 2020-04-09 VITALS
BODY MASS INDEX: 36.7 KG/M2 | HEART RATE: 66 BPM | HEIGHT: 72 IN | TEMPERATURE: 97.6 F | WEIGHT: 271 LBS | SYSTOLIC BLOOD PRESSURE: 120 MMHG | RESPIRATION RATE: 16 BRPM | OXYGEN SATURATION: 94 % | DIASTOLIC BLOOD PRESSURE: 66 MMHG

## 2020-04-09 DIAGNOSIS — M25.852 HIP IMPINGEMENT SYNDROME, LEFT: ICD-10-CM

## 2020-04-09 DIAGNOSIS — M25.552 CHRONIC LEFT HIP PAIN: ICD-10-CM

## 2020-04-09 DIAGNOSIS — G89.29 CHRONIC LEFT HIP PAIN: ICD-10-CM

## 2020-04-09 DIAGNOSIS — M47.816 LUMBAR SPONDYLOSIS: ICD-10-CM

## 2020-04-09 DIAGNOSIS — M16.10 ARTHRITIS OF HIP: ICD-10-CM

## 2020-04-09 DIAGNOSIS — M79.10 MYALGIA: ICD-10-CM

## 2020-04-09 DIAGNOSIS — E78.5 DYSLIPIDEMIA: ICD-10-CM

## 2020-04-09 DIAGNOSIS — M76.892 TENDINITIS INVOLVING LEFT HIP ABDUCTORS: ICD-10-CM

## 2020-04-09 DIAGNOSIS — E11.9 CONTROLLED TYPE 2 DIABETES MELLITUS WITHOUT COMPLICATION, WITHOUT LONG-TERM CURRENT USE OF INSULIN (HCC): ICD-10-CM

## 2020-04-09 LAB
HBA1C MFR BLD: 6.7 % (ref 0–5.6)
INT CON NEG: NEGATIVE
INT CON POS: POSITIVE

## 2020-04-09 PROCEDURE — 83036 HEMOGLOBIN GLYCOSYLATED A1C: CPT | Performed by: NURSE PRACTITIONER

## 2020-04-09 PROCEDURE — 99214 OFFICE O/P EST MOD 30 MIN: CPT | Performed by: NURSE PRACTITIONER

## 2020-04-09 PROCEDURE — 99214 OFFICE O/P EST MOD 30 MIN: CPT | Performed by: PHYSICAL MEDICINE & REHABILITATION

## 2020-04-09 ASSESSMENT — FIBROSIS 4 INDEX
FIB4 SCORE: 0.99
FIB4 SCORE: 0.99

## 2020-04-09 ASSESSMENT — PATIENT HEALTH QUESTIONNAIRE - PHQ9
5. POOR APPETITE OR OVEREATING: 0 - NOT AT ALL
SUM OF ALL RESPONSES TO PHQ QUESTIONS 1-9: 3
CLINICAL INTERPRETATION OF PHQ2 SCORE: 3

## 2020-04-09 ASSESSMENT — PAIN SCALES - GENERAL: PAINLEVEL: 6=MODERATE PAIN

## 2020-04-09 NOTE — ASSESSMENT & PLAN NOTE
Chronic issue.  Received an injection 1/2020 and this helped tremendously for 2 mo - now back.  Saw Dr. arellano this morning and they will be repeating injection when coronavirus pandemic resolved.

## 2020-04-09 NOTE — PROGRESS NOTES
Follow up patient note  Interventional spine and sports physiatry, Physical medicine rehabilitation      Chief complaint:   Chief Complaint   Patient presents with   • Follow-Up     Hip pain          HISTORY    Please see new patient note by Dr Long,  for more details.     HPI  Patient identification: Harlan Medina 50 y.o. male  With Diagnoses of Hip impingement syndrome, left, Arthritis of hip, Lumbar spondylosis however the majority the patient's back pain is likely coming from his hip, Chronic left hip pain, Tendinitis involving left hip abductors, and Myalgia were pertinent to this visit.     Procedures:  1/20/2020 diagnostic and therapeutic left intra-articular hip injection with fluoroscopic guidance      The patient reports he was unable to go to physical therapy because of the Covid 19 pandemic.    Previously he was nearly asymptomatic in regards to his left hip and back pain. He went horse back riding for two days and since that time he has had severe pain in the left buttocks ant hip since that point. 6-8/10 intensity, constant, aching in quality, nonradiating .denies trauma.          ROS Red Flags :   Fever, Chills, Sweats: Denies  Involuntary Weight Loss: Denies  Bowel/Bladder Incontinence: Denies  Saddle Anesthesia: Denies        PMHx:   Past Medical History:   Diagnosis Date   • LLQ abdominal pain 3/8/2014    Intermittent constipation x 1 year Will take otc meds to make him go No hematuria, blood in stool Not peeing; weak, hesitant stream at times, not much urine Feels need to move bowels, then can't  No large/hard or small/pebble - when he does go it's mushy and not much Prior to a year ago - normal 2/day BM No fam h/o colon ca   • Tobacco dependence 1/14/2016       PSHx:   Past Surgical History:   Procedure Laterality Date   • APPENDECTOMY      2006       Family history   Family History   Problem Relation Age of Onset   • Hypertension Father    • Diabetes Father    • Lung Disease Neg Hx     • Cancer Neg Hx    • Heart Disease Neg Hx    • Stroke Neg Hx          Medications:   Outpatient Medications Marked as Taking for the 4/9/20 encounter (Office Visit) with Donald Long M.D.   Medication Sig Dispense Refill   • Diclofenac Sodium 1 % Gel Apply 3 g to skin as directed 4 times a day as needed (pain). 150 g 3   • metFORMIN ER (GLUCOPHAGE XR) 500 MG TABLET SR 24 HR Take 1 Tab by mouth with dinner. 30 Tab 4   • ibuprofen (MOTRIN) 200 MG Tab Take 800 mg by mouth every 6 hours as needed.          Current Outpatient Medications on File Prior to Visit   Medication Sig Dispense Refill   • metFORMIN ER (GLUCOPHAGE XR) 500 MG TABLET SR 24 HR Take 1 Tab by mouth with dinner. 30 Tab 4   • ibuprofen (MOTRIN) 200 MG Tab Take 800 mg by mouth every 6 hours as needed.       No current facility-administered medications on file prior to visit.          Allergies:   No Known Allergies    Social Hx:   Social History     Socioeconomic History   • Marital status:      Spouse name: Not on file   • Number of children: Not on file   • Years of education: Not on file   • Highest education level: Not on file   Occupational History   • Not on file   Social Needs   • Financial resource strain: Not on file   • Food insecurity     Worry: Not on file     Inability: Not on file   • Transportation needs     Medical: Not on file     Non-medical: Not on file   Tobacco Use   • Smoking status: Never Smoker   • Smokeless tobacco: Current User     Types: Chew   • Tobacco comment: one can a day    Substance and Sexual Activity   • Alcohol use: Yes     Alcohol/week: 0.0 oz     Comment: socially   • Drug use: No   • Sexual activity: Yes     Partners: Female   Lifestyle   • Physical activity     Days per week: Not on file     Minutes per session: Not on file   • Stress: Not on file   Relationships   • Social connections     Talks on phone: Not on file     Gets together: Not on file     Attends [employer] service: Not on file     Active  member of club or organization: Not on file     Attends meetings of clubs or organizations: Not on file     Relationship status: Not on file   • Intimate partner violence     Fear of current or ex partner: Not on file     Emotionally abused: Not on file     Physically abused: Not on file     Forced sexual activity: Not on file   Other Topics Concern   •  Service No   • Blood Transfusions No   • Caffeine Concern No   • Occupational Exposure No   • Hobby Hazards No   • Sleep Concern No   • Stress Concern No   • Weight Concern Yes   • Special Diet No   • Back Care No   • Exercise Yes   • Bike Helmet No   • Seat Belt No   • Self-Exams No   Social History Narrative   • Not on file         EXAMINATION     Physical Exam:   Vitals: /68 (BP Location: Left arm, Patient Position: Sitting, BP Cuff Size: Adult)   Pulse 60   Temp 36.1 °C (97 °F) (Temporal)   Ht 1.829 m (6')   Wt 123.6 kg (272 lb 7.8 oz)   SpO2 94%     Constitutional:   Body Habitus: Body mass index is 36.96 kg/m².  Cooperation: Fully cooperates with exam  Appearance: Well-groomed no disheveled    Respiratory-  breathing comfortable on room air, no audible wheezing  Cardiovascular- capillary refills less than 2 seconds. No lower extremity edema is noted.   Psychiatric- alert and oriented ×3. Normal affect.    MSK and Neuro: -    Exam done 4/9/2020   Limited range of motion of the left hip.  Fairs maneuver is positive.  full  active range of motion with flexion, lateral flexion, and rotation bilaterally.   There is full  active range of motion with lumbar extension.      Palpation:   No tenderness to palpation in midline at T1-T12 levels. No tenderness to palpation in the left and right of the midline T1-L5, NEGATIVE for tenderness to palpation to the para-midline region in the lower lumbar levels.  palpation over SI joint: negative bilaterally    palpation in hip or over the greater trochanters: Positive left, negative right  Positive for  trigger points in the left lower lumbar paraspinous muscles, quadratus lumborum and gluteus beni    Lumbar spine Special tests  Neuro tension  Straight leg test negative bilaterally    Slump test negative bilaterally      Key points for the international standards for neurological classification of spinal cord injury (ISNCSCI) to light touch.     Dermatome R L                                      L2 2 2   L3 2 2   L4 2 2   L5 2 2   S1 2 2   S2 2 2         Motor Exam Lower Extremities    ? Myotome R L   Hip flexion L2 5 5   Knee extension L3 5 5   Ankle dorsiflexion L4 5 5   Toe extension L5 5 5   Ankle plantarflexion S1 5 5   }              MEDICAL DECISION MAKING    DATA    Labs:   Lab Results   Component Value Date/Time    SODIUM 138 04/16/2018 07:30 AM    POTASSIUM 4.4 04/16/2018 07:30 AM    CHLORIDE 105 04/16/2018 07:30 AM    CO2 26 04/16/2018 07:30 AM    GLUCOSE 146 (H) 04/16/2018 07:30 AM    BUN 14 04/16/2018 07:30 AM    CREATININE 1.10 04/16/2018 07:30 AM    BUNCREATRAT 12 03/14/2014 12:00 AM        No results found for: PROTHROMBTM, INR     Lab Results   Component Value Date/Time    WBC 5.6 07/09/2019 10:32 PM    RBC 5.18 07/09/2019 10:32 PM    HEMOGLOBIN 15.8 07/09/2019 10:32 PM    HEMATOCRIT 47.3 07/09/2019 10:32 PM    MCV 91.3 07/09/2019 10:32 PM    MCH 30.5 07/09/2019 10:32 PM    MCHC 33.4 (L) 07/09/2019 10:32 PM    MPV 12.9 07/09/2019 10:32 PM    NEUTSPOLYS 66.80 07/09/2019 10:32 PM    LYMPHOCYTES 22.90 07/09/2019 10:32 PM    MONOCYTES 7.30 07/09/2019 10:32 PM    EOSINOPHILS 2.10 07/09/2019 10:32 PM    BASOPHILS 0.70 07/09/2019 10:32 PM        Lab Results   Component Value Date/Time    HBA1C 6.7 (A) 04/09/2020 01:15 PM          Imaging:   I personally reviewed following images    Fluoroscopic images from 1/20/2020 showing successful left hip injection    I reviewed the following radiology reports                                        Results for orders placed during the hospital encounter of  11/07/19   MR-LUMBAR SPINE-W/O    Impression 1.  Stable mild lumbar spondylosis as detailed including small posterior central disc protrusion at L4-L5.  2.  No significant final or foraminal stenosis.                  Results for orders placed during the hospital encounter of 11/07/19   MR-LUMBAR SPINE-W/O    Impression 1.  Stable mild lumbar spondylosis as detailed including small posterior central disc protrusion at L4-L5.  2.  No significant final or foraminal stenosis.                                                                                                                             Results for orders placed during the hospital encounter of 05/03/18   CT-ABDOMEN-PELVIS WITH    Impression 1.  Diverticulosis without evidence of diverticulitis.    2.  Fatty infiltration of the liver.    3.  Small low-attenuation lesions in the kidneys which are too small to clearly characterize though likely represent cysts.                                                                                 Results for orders placed during the hospital encounter of 11/07/19   DX-LUMBAR SPINE-4+ VIEWS    Impression Mild multilevel degenerative disc disease and facet degeneration.                                         DIAGNOSIS   Visit Diagnoses     ICD-10-CM   1. Hip impingement syndrome, left M25.852   2. Arthritis of hip M16.10   3. Lumbar spondylosis however the majority the patient's back pain is likely coming from his hip M47.816   4. Chronic left hip pain M25.552    G89.29   5. Tendinitis involving left hip abductors M76.892   6. Myalgia M79.10         ASSESSMENT and PLAN:     Harlan Johnny Medina 50 y.o. male      Harlan was seen today for follow-up.    Diagnoses and all orders for this visit:    Hip impingement syndrome, left  -     Diclofenac Sodium 1 % Gel; Apply 3 g to skin as directed 4 times a day as needed (pain).    Arthritis of hip  -     Diclofenac Sodium 1 % Gel; Apply 3 g to skin as directed 4 times a day  as needed (pain).    Lumbar spondylosis however the majority the patient's back pain is likely coming from his hip  -     Diclofenac Sodium 1 % Gel; Apply 3 g to skin as directed 4 times a day as needed (pain).    Chronic left hip pain  -     Diclofenac Sodium 1 % Gel; Apply 3 g to skin as directed 4 times a day as needed (pain).    Tendinitis involving left hip abductors  -     REFERRAL TO PHYSIATRY (PMR)  -     Diclofenac Sodium 1 % Gel; Apply 3 g to skin as directed 4 times a day as needed (pain).    Myalgia  -     REFERRAL TO PHYSIATRY (PMR)  -     Diclofenac Sodium 1 % Gel; Apply 3 g to skin as directed 4 times a day as needed (pain).        I have ordered a left gluteus medius peritendinous injection and trigger point injections in the area of pain with ultrasound guidance.  I would consider the patient for an additional intra-articular left hip injection versus left hip acetabular nerve blocks if he continues to have pain after the above procedure.    The risks benefits and alternatives to this procedure were discussed and the patient wishes to proceed with the procedure. Risks include but are not limited to damage to surrounding structures, infection, bleeding, worsening of pain which can be permanent, weakness which can be permanent. Benefits include pain relief, improved function. Alternatives includes not doing the procedure.        Follow up: 4/13/2020     Thank you for allowing me to participate in the care of this patient. If you have any questions please not hesitate to contact me.          Please note that this dictation was created using voice recognition software. I have made every reasonable attempt to correct obvious errors but there may be errors of grammar and content that I may have overlooked prior to finalization of this note.      Donald Long MD  Interventional Spine and Sports Physiatry  Physical Medicine and Rehabilitation  [facility]

## 2020-04-09 NOTE — PROGRESS NOTES
CC  f/u    HPI  Matthias is a 51 yo est male here to f/u on chronic issues but feeling well - newly working for self and stress level is down.     #1- Controlled type 2 diabetes mellitus without complication, without long-term current use of insulin (HCC)  Chronic issue.  Lost 7 lbs over the past few months in [place] - on shake type diet.  Following a diabetic diet fairly closely.  Physically active at work.  Stress is much less working for self.  Denies vision troubles / pain or burning in feet.  Nocturia x 2, chronic for pt - denies dysuria.  He does not regularly check his blood sugars.  He is compliant with metformin every night with dinner and denies any negative side effects.    Component      Latest Ref Rng & Units 4/16/2018 9/20/2018 12/20/2018 7/9/2019           7:30 AM  7:25 AM  7:20 AM 10:32 PM   Glycohemoglobin      0.0 - 5.6 % 6.6 (H) 6.2 6.7 7.4 (H)   Internal Control Negative        Negative Negative    Internal Control Positive        Positive Positive    Estim. Avg Glu      mg/dL 143   166     Component      Latest Ref Rng & Units 10/14/2019           8:04 AM   Glycohemoglobin      0.0 - 5.6 % 6.9 (A)   Internal Control Negative       Negative   Internal Control Positive       Positive   Estim. Avg Glu      mg/dL        #2-chronic left hip and low back pain  Chronic issue.  Received an injection in his left hip 1/2020 and this helped tremendously for 2 mo - now back.  Saw Dr. arellano this morning and they will be repeating injection in the near future.  He will be possibly having left hip surgery when the coronavirus pandemic resolves    #3-dyslipidemia: Chronic issue.  Triglycerides were up to 268 9 months ago with an HDL of 37, down from 49 a year ago.  He does mention that he has been putting a lot more effort towards vegetables and exercise.  He has never had a heart attack or stroke and does not smoke.  He prefers to refrain from as many prescription medications as possible, particularly  statins.    Past medical, surgical, family, and social history is reviewed and updated in Epic chart by me today.   Medications and allergies reviewed and updated in Epic chart by me today.     ROS:   As documented in history of present illness above    Exam:  /66 (BP Location: Left arm, Patient Position: Sitting, BP Cuff Size: Large adult)   Pulse 66   Temp 36.4 °C (97.6 °F)   Resp 16   Ht 1.829 m (6')   Wt 122.9 kg (271 lb)   SpO2 94%   Constitutional: Alert, no distress, plus 3 vital signs  Skin:  Warm, dry, no rashes invisible areas  Eye: Equal, round and reactive, conjunctiva clear  ENMT: Lips without lesions  Respiratory: Unlabored respiration  Cardiovascular: Normal rate   Psych: Alert, pleasant, well-groomed, normal affect  Monofilament testing with a 10 gram force: sensation intact: intact bilaterally  Visual Inspection: Feet without maceration, ulcers, fissures.  Pedal pulses: intact bilaterally      Assessment / Plan / Medical Decision makin. Controlled type 2 diabetes mellitus without complication, without long-term current use of insulin (HCC)  -Stable.  A1c is down 0.2 which is progress.  He does seem motivated to lose more weight and exercise.  We discussed the importance of a diabetic diet.  Discussed Eye exam once yearly -   - Discussed Dental exam once yearly -   - Discussed vaccinations: Yearly flu vaccine, Pneumovax, and hepatitis B vaccine -   - Discussed A1C target below 7.5%  - Discussed CMP and A1C evaluation every 6 months  - Discussed Lipid and spot urine albumin to Cr ratio once per year at minimum'    2. Dyslipidemia  -discussed the importance of a plant-based diet, daily physical exercise and weight loss.   Lipid Profile; Future    3. Chronic left hip pain  -Improved for a brief period of time, now returned.  Up-to-date with physiatry and looking forward to another injection in his hip.  I encouraged him to work on weight loss as well.    Follow-up 6 months.

## 2020-04-09 NOTE — ASSESSMENT & PLAN NOTE
Chronic issue.  Lost 7 lbs over the past few months in [place] - on shake type diet.  Following a diabetic diet fairly closely.  Physically active at work.  Stress is much less working for self.  Denies vision troubles / pain or burning in feet.  Nocturia x 2, chronic for pt - denies dysuria.      Component      Latest Ref Rng & Units 4/16/2018 9/20/2018 12/20/2018 7/9/2019           7:30 AM  7:25 AM  7:20 AM 10:32 PM   Glycohemoglobin      0.0 - 5.6 % 6.6 (H) 6.2 6.7 7.4 (H)   Internal Control Negative        Negative Negative    Internal Control Positive        Positive Positive    Estim. Avg Glu      mg/dL 143   166     Component      Latest Ref Rng & Units 10/14/2019           8:04 AM   Glycohemoglobin      0.0 - 5.6 % 6.9 (A)   Internal Control Negative       Negative   Internal Control Positive       Positive   Estim. Avg Glu      mg/dL

## 2020-04-13 ENCOUNTER — OFFICE VISIT (OUTPATIENT)
Dept: PHYSICAL MEDICINE AND REHAB | Facility: MEDICAL CENTER | Age: 50
End: 2020-04-13
Payer: COMMERCIAL

## 2020-04-13 VITALS
WEIGHT: 270.95 LBS | TEMPERATURE: 97.5 F | HEART RATE: 64 BPM | SYSTOLIC BLOOD PRESSURE: 120 MMHG | OXYGEN SATURATION: 97 % | BODY MASS INDEX: 36.7 KG/M2 | DIASTOLIC BLOOD PRESSURE: 70 MMHG | HEIGHT: 72 IN

## 2020-04-13 DIAGNOSIS — M79.10 MYALGIA: ICD-10-CM

## 2020-04-13 DIAGNOSIS — M76.892 TENDINITIS INVOLVING LEFT HIP ABDUCTORS: ICD-10-CM

## 2020-04-13 PROCEDURE — 20553 NJX 1/MLT TRIGGER POINTS 3/>: CPT | Mod: 59 | Performed by: PHYSICAL MEDICINE & REHABILITATION

## 2020-04-13 PROCEDURE — 20611 DRAIN/INJ JOINT/BURSA W/US: CPT | Mod: LT | Performed by: PHYSICAL MEDICINE & REHABILITATION

## 2020-04-13 RX ORDER — DEXAMETHASONE SODIUM PHOSPHATE 4 MG/ML
8 INJECTION, SOLUTION INTRA-ARTICULAR; INTRALESIONAL; INTRAMUSCULAR; INTRAVENOUS; SOFT TISSUE ONCE
Status: COMPLETED | OUTPATIENT
Start: 2020-04-13 | End: 2020-04-13

## 2020-04-13 RX ADMIN — DEXAMETHASONE SODIUM PHOSPHATE 8 MG: 4 INJECTION, SOLUTION INTRA-ARTICULAR; INTRALESIONAL; INTRAMUSCULAR; INTRAVENOUS; SOFT TISSUE at 08:19

## 2020-04-13 ASSESSMENT — FIBROSIS 4 INDEX: FIB4 SCORE: 0.99

## 2020-04-13 ASSESSMENT — PATIENT HEALTH QUESTIONNAIRE - PHQ9: CLINICAL INTERPRETATION OF PHQ2 SCORE: 0

## 2020-04-13 NOTE — PROCEDURES
Date of Service: 4/13/2020    Physician/s: Donald Long MD    Pre-operative Diagnosis: LEFT gluteus medius tendinitis/tendinopathy    Post-operative Diagnosis: LEFT  gluteus medius tendinitis/tendinopathy    Procedure: LEFT gluteus medius peritendinous injection ultrasound-guided    Description of procedure:    The risks, benefits, and alternatives of the procedure were reviewed and discussed with the patient.  Written informed consent was freely obtained. A pre-procedural time-out was conducted by the physician verifying patient’s identity, procedure to be performed, procedure site and side, and allergy verification. Appropriate equipment was determined to be in place for the procedure.     No sedation was used for this procedure.     In the office suite the patient was placed in a lateral position with his LEFT  side up, and the skin was prepped and draped in the usual sterile fashion. The ultrasound probe was placed over the LEFT  greater trochanter and the gluteus medius tendon was located and the target for injection was marked. 1% lidocaine was used as a local anesthetic with 27g needle, the needle was removed intact.  A 25g 3.5 inch needle was placed into skin and advanced under ultrasound guidance adjacent to the gluteus medius tendon at the level of the greater trochanter. Following negative aspiration, approx 4cc of 1% lidocaine with 1cc of  4mg/mL dexamethasone was then injected adjacent to the tendon and the needle was subsequently removed intact after restyleted. The patient's hip was wiped with a 4x4 gauze, the area was cleansed with alcohol prep, and a bandaid was applied. There were no complications noted. The images were saved for permanent storage.         Preprocedural pain: 6/10  Postprocedural pain: 0/10      Donald Long MD  Physical Medicine and Rehabilitation  Interventional Spine and Sports Physiatry  [facility]

## 2020-04-13 NOTE — PROCEDURES
Date of Service: 4/13/2020     Physician/s: Donald Long MD    Pre-operative Diagnosis: Myalgia (M79.1)    Post-operative Diagnosis: Myalgia (M79.1)    Procedure: left gluteus medius muscle, left lower paraspinous muscle, left quadratus lumborum muscle trigger point injections    Description of procedure:    The risks, benefits, and alternatives of the procedure were reviewed and discussed with the patient.  Written informed consent was freely obtained. A pre-procedural time-out was conducted by the physician verifying patient’s identity, procedure to be performed, procedure site and side, and allergy verification. Appropriate equipment was determined to be in place for the procedure.     In the office suite exam room the patient was placed in a prone position and the skin areas for injection over the left gluteus medius muscle, left lower paraspinous muscle, left quadratus lumborum muscle was marked. A solution was prepared with 1 mL of 4 mg/mL of dexamethasone, 5 mL of 1% lidocaine and 4 mL of sterile saline. The areas of pain was then prepped and draped in the usual sterile fashion. Ultrasound was confirmed to view the adjacent structures for blood vessels and nerves and to confirm the needle path. A 27g needle was placed into each of the markings at the areas above under ultrasound guidance with an out of plane approach.. After negative aspiration, approximately a 1.5 mL of the above solution was injected. The needle was removed intact after each trigger point injection, and the patient's back was covered with a 4x4 gauze, the area was cleansed with sterile normal saline, and a dressing was applied. There were no complications noted. The images were uploaded to our media tab for permanent storage.    Preprocedural pain: 6/10  Postprocedural pain: 0/10    Donald Long MD  Physical Medicine and Rehabilitation  Interventional Spine and Sports Physiatry  [facility]

## 2020-04-28 DIAGNOSIS — E11.9 CONTROLLED TYPE 2 DIABETES MELLITUS WITHOUT COMPLICATION, WITHOUT LONG-TERM CURRENT USE OF INSULIN (HCC): ICD-10-CM

## 2020-04-28 RX ORDER — METFORMIN HYDROCHLORIDE 500 MG/1
500 TABLET, EXTENDED RELEASE ORAL
Qty: 30 TAB | Refills: 4 | Status: ON HOLD
Start: 2020-04-28 | End: 2020-06-12

## 2020-05-11 ENCOUNTER — TELEPHONE (OUTPATIENT)
Dept: PHYSICAL MEDICINE AND REHAB | Facility: MEDICAL CENTER | Age: 50
End: 2020-05-11

## 2020-05-11 DIAGNOSIS — G89.29 CHRONIC LEFT HIP PAIN: ICD-10-CM

## 2020-05-11 DIAGNOSIS — M16.10 ARTHRITIS OF HIP: ICD-10-CM

## 2020-05-11 DIAGNOSIS — M25.852 HIP IMPINGEMENT SYNDROME, LEFT: ICD-10-CM

## 2020-05-11 DIAGNOSIS — M25.552 CHRONIC LEFT HIP PAIN: ICD-10-CM

## 2020-05-11 NOTE — TELEPHONE ENCOUNTER
Spoke to Pt in regards to his VM, in regards to his question about a referral to a surgeon for hip grinded bone.  He just need to check in regards to his last visit discussion.    Please Advise    Thank you  Alejandra

## 2020-05-11 NOTE — TELEPHONE ENCOUNTER
Spoke to Pt and notified that he been referred to Dr. Dykes or any provider who is in network.    Thank you  Alejandra

## 2020-06-03 DIAGNOSIS — Z01.812 PRE-OPERATIVE LABORATORY EXAMINATION: ICD-10-CM

## 2020-06-03 LAB
ANION GAP SERPL CALC-SCNC: 12 MMOL/L (ref 7–16)
BUN SERPL-MCNC: 13 MG/DL (ref 8–22)
CALCIUM SERPL-MCNC: 9 MG/DL (ref 8.4–10.2)
CHLORIDE SERPL-SCNC: 104 MMOL/L (ref 96–112)
CO2 SERPL-SCNC: 23 MMOL/L (ref 20–33)
CREAT SERPL-MCNC: 0.97 MG/DL (ref 0.5–1.4)
GLUCOSE SERPL-MCNC: 128 MG/DL (ref 65–99)
POTASSIUM SERPL-SCNC: 4 MMOL/L (ref 3.6–5.5)
SODIUM SERPL-SCNC: 139 MMOL/L (ref 135–145)

## 2020-06-03 PROCEDURE — 80048 BASIC METABOLIC PNL TOTAL CA: CPT

## 2020-06-03 PROCEDURE — 36415 COLL VENOUS BLD VENIPUNCTURE: CPT

## 2020-06-09 ENCOUNTER — APPOINTMENT (OUTPATIENT)
Dept: ADMISSIONS | Facility: MEDICAL CENTER | Age: 50
End: 2020-06-09
Attending: ORTHOPAEDIC SURGERY
Payer: COMMERCIAL

## 2020-06-09 DIAGNOSIS — Z01.812 PRE-OPERATIVE LABORATORY EXAMINATION: ICD-10-CM

## 2020-06-09 LAB — COVID ORDER STATUS COVID19: NORMAL

## 2020-06-09 PROCEDURE — C9803 HOPD COVID-19 SPEC COLLECT: HCPCS

## 2020-06-11 LAB
SARS-COV-2 RNA RESP QL NAA+PROBE: NOT DETECTED
SPECIMEN SOURCE: NORMAL

## 2020-06-12 ENCOUNTER — ANESTHESIA (OUTPATIENT)
Dept: SURGERY | Facility: MEDICAL CENTER | Age: 50
End: 2020-06-12
Payer: COMMERCIAL

## 2020-06-12 ENCOUNTER — APPOINTMENT (OUTPATIENT)
Dept: RADIOLOGY | Facility: MEDICAL CENTER | Age: 50
End: 2020-06-12
Attending: ORTHOPAEDIC SURGERY
Payer: COMMERCIAL

## 2020-06-12 ENCOUNTER — HOSPITAL ENCOUNTER (OUTPATIENT)
Facility: MEDICAL CENTER | Age: 50
End: 2020-06-12
Attending: ORTHOPAEDIC SURGERY | Admitting: ORTHOPAEDIC SURGERY
Payer: COMMERCIAL

## 2020-06-12 ENCOUNTER — ANESTHESIA EVENT (OUTPATIENT)
Dept: SURGERY | Facility: MEDICAL CENTER | Age: 50
End: 2020-06-12
Payer: COMMERCIAL

## 2020-06-12 VITALS
BODY MASS INDEX: 35.5 KG/M2 | WEIGHT: 262.13 LBS | HEIGHT: 72 IN | OXYGEN SATURATION: 93 % | RESPIRATION RATE: 16 BRPM | HEART RATE: 62 BPM | DIASTOLIC BLOOD PRESSURE: 70 MMHG | SYSTOLIC BLOOD PRESSURE: 122 MMHG | TEMPERATURE: 97.2 F

## 2020-06-12 LAB — GLUCOSE BLD-MCNC: 124 MG/DL (ref 65–99)

## 2020-06-12 PROCEDURE — 700102 HCHG RX REV CODE 250 W/ 637 OVERRIDE(OP): Performed by: ANESTHESIOLOGY

## 2020-06-12 PROCEDURE — 160046 HCHG PACU - 1ST 60 MINS PHASE II: Performed by: ORTHOPAEDIC SURGERY

## 2020-06-12 PROCEDURE — 160036 HCHG PACU - EA ADDL 30 MINS PHASE I: Performed by: ORTHOPAEDIC SURGERY

## 2020-06-12 PROCEDURE — 160042 HCHG SURGERY MINUTES - EA ADDL 1 MIN LEVEL 5: Performed by: ORTHOPAEDIC SURGERY

## 2020-06-12 PROCEDURE — 700101 HCHG RX REV CODE 250: Performed by: ANESTHESIOLOGY

## 2020-06-12 PROCEDURE — A9270 NON-COVERED ITEM OR SERVICE: HCPCS | Performed by: ANESTHESIOLOGY

## 2020-06-12 PROCEDURE — 160002 HCHG RECOVERY MINUTES (STAT): Performed by: ORTHOPAEDIC SURGERY

## 2020-06-12 PROCEDURE — 700111 HCHG RX REV CODE 636 W/ 250 OVERRIDE (IP): Performed by: ORTHOPAEDIC SURGERY

## 2020-06-12 PROCEDURE — 501445 HCHG STAPLER, SKIN DISP: Performed by: ORTHOPAEDIC SURGERY

## 2020-06-12 PROCEDURE — 700111 HCHG RX REV CODE 636 W/ 250 OVERRIDE (IP): Performed by: ANESTHESIOLOGY

## 2020-06-12 PROCEDURE — 501838 HCHG SUTURE GENERAL: Performed by: ORTHOPAEDIC SURGERY

## 2020-06-12 PROCEDURE — 160009 HCHG ANES TIME/MIN: Performed by: ORTHOPAEDIC SURGERY

## 2020-06-12 PROCEDURE — 700105 HCHG RX REV CODE 258: Performed by: ORTHOPAEDIC SURGERY

## 2020-06-12 PROCEDURE — 160025 RECOVERY II MINUTES (STATS): Performed by: ORTHOPAEDIC SURGERY

## 2020-06-12 PROCEDURE — 700105 HCHG RX REV CODE 258: Performed by: ANESTHESIOLOGY

## 2020-06-12 PROCEDURE — 160035 HCHG PACU - 1ST 60 MINS PHASE I: Performed by: ORTHOPAEDIC SURGERY

## 2020-06-12 PROCEDURE — 502581 HCHG PACK, SHOULDER ARTHROSCOPY: Performed by: ORTHOPAEDIC SURGERY

## 2020-06-12 PROCEDURE — 73502 X-RAY EXAM HIP UNI 2-3 VIEWS: CPT | Mod: LT

## 2020-06-12 PROCEDURE — 82962 GLUCOSE BLOOD TEST: CPT

## 2020-06-12 PROCEDURE — 160048 HCHG OR STATISTICAL LEVEL 1-5: Performed by: ORTHOPAEDIC SURGERY

## 2020-06-12 PROCEDURE — 160031 HCHG SURGERY MINUTES - 1ST 30 MINS LEVEL 5: Performed by: ORTHOPAEDIC SURGERY

## 2020-06-12 RX ORDER — HALOPERIDOL 5 MG/ML
1 INJECTION INTRAMUSCULAR
Status: DISCONTINUED | OUTPATIENT
Start: 2020-06-12 | End: 2020-06-12 | Stop reason: HOSPADM

## 2020-06-12 RX ORDER — ONDANSETRON 2 MG/ML
4 INJECTION INTRAMUSCULAR; INTRAVENOUS
Status: DISCONTINUED | OUTPATIENT
Start: 2020-06-12 | End: 2020-06-12 | Stop reason: HOSPADM

## 2020-06-12 RX ORDER — METOPROLOL TARTRATE 1 MG/ML
1 INJECTION, SOLUTION INTRAVENOUS
Status: DISCONTINUED | OUTPATIENT
Start: 2020-06-12 | End: 2020-06-12 | Stop reason: HOSPADM

## 2020-06-12 RX ORDER — MIDAZOLAM HYDROCHLORIDE 2 MG/ML
SYRUP ORAL PRN
Status: DISCONTINUED | OUTPATIENT
Start: 2020-06-12 | End: 2020-06-12 | Stop reason: SURG

## 2020-06-12 RX ORDER — DEXAMETHASONE SODIUM PHOSPHATE 4 MG/ML
INJECTION, SOLUTION INTRA-ARTICULAR; INTRALESIONAL; INTRAMUSCULAR; INTRAVENOUS; SOFT TISSUE PRN
Status: DISCONTINUED | OUTPATIENT
Start: 2020-06-12 | End: 2020-06-12 | Stop reason: SURG

## 2020-06-12 RX ORDER — ACETAMINOPHEN 500 MG
1000 TABLET ORAL ONCE
Status: DISCONTINUED | OUTPATIENT
Start: 2020-06-12 | End: 2020-06-12 | Stop reason: HOSPADM

## 2020-06-12 RX ORDER — OXYCODONE HCL 5 MG/5 ML
10 SOLUTION, ORAL ORAL
Status: COMPLETED | OUTPATIENT
Start: 2020-06-12 | End: 2020-06-12

## 2020-06-12 RX ORDER — HYDRALAZINE HYDROCHLORIDE 20 MG/ML
5 INJECTION INTRAMUSCULAR; INTRAVENOUS
Status: DISCONTINUED | OUTPATIENT
Start: 2020-06-12 | End: 2020-06-12 | Stop reason: HOSPADM

## 2020-06-12 RX ORDER — GABAPENTIN 300 MG/1
300 CAPSULE ORAL ONCE
Status: DISCONTINUED | OUTPATIENT
Start: 2020-06-12 | End: 2020-06-12 | Stop reason: HOSPADM

## 2020-06-12 RX ORDER — ROPIVACAINE HYDROCHLORIDE 5 MG/ML
INJECTION, SOLUTION EPIDURAL; INFILTRATION; PERINEURAL
Status: DISCONTINUED | OUTPATIENT
Start: 2020-06-12 | End: 2020-06-12 | Stop reason: HOSPADM

## 2020-06-12 RX ORDER — DIPHENHYDRAMINE HYDROCHLORIDE 50 MG/ML
12.5 INJECTION INTRAMUSCULAR; INTRAVENOUS
Status: DISCONTINUED | OUTPATIENT
Start: 2020-06-12 | End: 2020-06-12 | Stop reason: HOSPADM

## 2020-06-12 RX ORDER — SODIUM CHLORIDE, SODIUM LACTATE, POTASSIUM CHLORIDE, CALCIUM CHLORIDE 600; 310; 30; 20 MG/100ML; MG/100ML; MG/100ML; MG/100ML
INJECTION, SOLUTION INTRAVENOUS CONTINUOUS
Status: DISCONTINUED | OUTPATIENT
Start: 2020-06-12 | End: 2020-06-12 | Stop reason: HOSPADM

## 2020-06-12 RX ORDER — CEFAZOLIN SODIUM 1 G/3ML
INJECTION, POWDER, FOR SOLUTION INTRAMUSCULAR; INTRAVENOUS PRN
Status: DISCONTINUED | OUTPATIENT
Start: 2020-06-12 | End: 2020-06-12 | Stop reason: SURG

## 2020-06-12 RX ORDER — ONDANSETRON 2 MG/ML
INJECTION INTRAMUSCULAR; INTRAVENOUS PRN
Status: DISCONTINUED | OUTPATIENT
Start: 2020-06-12 | End: 2020-06-12 | Stop reason: SURG

## 2020-06-12 RX ORDER — OXYCODONE HCL 5 MG/5 ML
5 SOLUTION, ORAL ORAL
Status: COMPLETED | OUTPATIENT
Start: 2020-06-12 | End: 2020-06-12

## 2020-06-12 RX ADMIN — ROCURONIUM BROMIDE 100 MG: 10 INJECTION, SOLUTION INTRAVENOUS at 11:46

## 2020-06-12 RX ADMIN — SODIUM CHLORIDE, POTASSIUM CHLORIDE, SODIUM LACTATE AND CALCIUM CHLORIDE: 600; 310; 30; 20 INJECTION, SOLUTION INTRAVENOUS at 11:32

## 2020-06-12 RX ADMIN — CEFAZOLIN 2 G: 1 INJECTION, POWDER, FOR SOLUTION INTRAVENOUS at 11:41

## 2020-06-12 RX ADMIN — PROPOFOL 200 MG: 10 INJECTION, EMULSION INTRAVENOUS at 11:46

## 2020-06-12 RX ADMIN — DEXAMETHASONE SODIUM PHOSPHATE 4 MG: 4 INJECTION, SOLUTION INTRAMUSCULAR; INTRAVENOUS at 12:21

## 2020-06-12 RX ADMIN — SODIUM CHLORIDE, POTASSIUM CHLORIDE, SODIUM LACTATE AND CALCIUM CHLORIDE: 600; 310; 30; 20 INJECTION, SOLUTION INTRAVENOUS at 13:17

## 2020-06-12 RX ADMIN — DEXMEDETOMIDINE HYDROCHLORIDE 50 MCG: 100 INJECTION, SOLUTION INTRAVENOUS at 13:40

## 2020-06-12 RX ADMIN — FENTANYL CITRATE 150 MCG: 50 INJECTION, SOLUTION INTRAMUSCULAR; INTRAVENOUS at 12:05

## 2020-06-12 RX ADMIN — OXYCODONE HYDROCHLORIDE 5 MG: 5 SOLUTION ORAL at 14:33

## 2020-06-12 RX ADMIN — ONDANSETRON 4 MG: 2 INJECTION INTRAMUSCULAR; INTRAVENOUS at 12:21

## 2020-06-12 RX ADMIN — MIDAZOLAM HYDROCHLORIDE 2 MG: 2 SYRUP ORAL at 11:44

## 2020-06-12 RX ADMIN — FENTANYL CITRATE 25 MCG: 50 INJECTION INTRAMUSCULAR; INTRAVENOUS at 14:28

## 2020-06-12 RX ADMIN — FENTANYL CITRATE 100 MCG: 50 INJECTION, SOLUTION INTRAMUSCULAR; INTRAVENOUS at 11:46

## 2020-06-12 RX ADMIN — FENTANYL CITRATE 100 MCG: 50 INJECTION, SOLUTION INTRAMUSCULAR; INTRAVENOUS at 12:58

## 2020-06-12 RX ADMIN — SUGAMMADEX 200 MG: 100 INJECTION, SOLUTION INTRAVENOUS at 13:24

## 2020-06-12 ASSESSMENT — PAIN SCALES - GENERAL: PAIN_LEVEL: 3

## 2020-06-12 NOTE — ANESTHESIA PROCEDURE NOTES
Airway    Date/Time: 6/12/2020 11:46 AM  Performed by: Dave Carreon M.D.  Authorized by: Dave Carreon M.D.     Location:  OR  Urgency:  Elective  Indications for Airway Management:  Anesthesia      Spontaneous Ventilation: absent    Sedation Level:  Deep  Preoxygenated: Yes    Patient Position:  Sniffing  Final Airway Type:  Endotracheal airway  Final Endotracheal Airway:  ETT  Cuffed: Yes    Technique Used for Successful ETT Placement:  Direct laryngoscopy    Insertion Site:  Oral  Blade Type:  Glide  Laryngoscope Blade/Videolaryngoscope Blade Size:  4  ETT Size (mm):  7.5  Measured from:  Teeth  ETT to Teeth (cm):  21  Placement Verified by: auscultation and capnometry    Cormack-Lehane Classification:  Grade I - full view of glottis  Number of Attempts at Approach:  1

## 2020-06-12 NOTE — ANESTHESIA PREPROCEDURE EVALUATION
Relevant Problems   ENDO   (+) Controlled type 2 diabetes mellitus without complication, without long-term current use of insulin (HCC)       Physical Exam    Airway   Mallampati: III  TM distance: >3 FB  Neck ROM: full       Cardiovascular - normal exam  Rhythm: regular  Rate: normal  (-) murmur     Dental - normal exam           Pulmonary - normal exam  Breath sounds clear to auscultation     Abdominal    Neurological - normal exam                 Anesthesia Plan    ASA 2       Plan - general       Airway plan will be ETT        Induction: intravenous    Postoperative Plan: Postoperative administration of opioids is intended.    Pertinent diagnostic labs and testing reviewed    Informed Consent:    Anesthetic plan and risks discussed with patient.    Use of blood products discussed with: patient whom consented to blood products.

## 2020-06-12 NOTE — OR NURSING
1442: Patient arrived to Phase II.     1509: Wife contacted by telephone that Patient is ready for pick.    1530: D/Byron to care of family post uneventful stay in PACU 2.

## 2020-06-12 NOTE — OR NURSING
"1344  Received from or  resp spont  Left hip dressing c/d/i  Dp2+  Foot warm  Ice pack applied  No c/o pain  C/o pain and being very uncomfortable on gurney  1414 medicated for pain [id number]   Very anxious to get off dinora  Pt states he\" wants out of here\"  To stage 2  "

## 2020-06-12 NOTE — DISCHARGE INSTRUCTIONS
ACTIVITY: Rest and take it easy for the first 24 hours.  A responsible adult is recommended to remain with you during that time.  It is normal to feel sleepy.  We encourage you to not do anything that requires balance, judgment or coordination.    MILD FLU-LIKE SYMPTOMS ARE NORMAL. YOU MAY EXPERIENCE GENERALIZED MUSCLE ACHES, THROAT IRRITATION, HEADACHE AND/OR SOME NAUSEA.    FOR 24 HOURS DO NOT:  Drive, operate machinery or run household appliances.   Do not stop Metformin    Drink beer or alcoholic beverages.   Make important decisions or sign legal documents.    SPECIAL INSTRUCTIONS: 50% weight bearing on surgical leg with crutches  Wear Shahram hose on both legs for 2 weeks   Out patient physical Therapy to begin 2-4 days  Aspirin 325 mg  Take     DIET: To avoid nausea, slowly advance diet as tolerated, avoiding spicy or greasy foods for the first day.  Add more substantial food to your diet according to your physician's instructions.  Babies can be fed formula or breast milk as soon as they are hungry.  INCREASE FLUIDS AND FIBER TO AVOID CONSTIPATION.    SURGICAL DRESSING/BATHING: Remove bandage in 5 days.  Replace sooner for drainage and notify office    FOLLOW-UP APPOINTMENT:  A follow-up appointment should be arranged with your doctor in 10-14 days; call to schedule.    You should CALL YOUR PHYSICIAN if you develop:  Fever greater than 101 degrees F.  Pain not relieved by medication, or persistent nausea or vomiting.  Excessive bleeding (blood soaking through dressing) or unexpected drainage from the wound.  Extreme redness or swelling around the incision site, drainage of pus or foul smelling drainage.  Inability to urinate or empty your bladder within 8 hours.  Problems with breathing or chest pain.    You should call 911 if you develop problems with breathing or chest pain.  If you are unable to contact your doctor or surgical center, you should go to the nearest emergency room or urgent care center.   Physician's telephone #: [phone]    If any questions arise, call your doctor.  If your doctor is not available, please feel free to call the Surgical Center at [phone].  The Center is open Monday through Friday from 7AM to 7PM.  You can also call the HEALTH HOTLINE open 24 hours/day, 7 days/week and speak to a nurse at [phone], or toll free at [phone].    A registered nurse may call you a few days after your surgery to see how you are doing after your procedure.    MEDICATIONS: Resume taking daily medication.  Take prescribed pain medication with food.  If no medication is prescribed, you may take non-aspirin pain medication if needed.  PAIN MEDICATION CAN BE VERY CONSTIPATING.  Take a stool softener or laxative such as senokot, pericolace, or milk of magnesia if needed.    Prescription given for Norco, ASA and Meloxicam [already has].  Last pain medication given at 2:30    If your physician has prescribed pain medication that includes Acetaminophen (Tylenol), do not take additional Acetaminophen (Tylenol) while taking the prescribed medication.    Depression / Suicide Risk    As you are discharged from this [facility] facility, it is important to learn how to keep safe from harming yourself.    Recognize the warning signs:  · Abrupt changes in personality, positive or negative- including increase in energy   · Giving away possessions  · Change in eating patterns- significant weight changes-  positive or negative  · Change in sleeping patterns- unable to sleep or sleeping all the time   · Unwillingness or inability to communicate  · Depression  · Unusual sadness, discouragement and loneliness  · Talk of wanting to die  · Neglect of personal appearance   · Rebelliousness- reckless behavior  · Withdrawal from people/activities they love  · Confusion- inability to concentrate     If you or a loved one observes any of these behaviors or has concerns about self-harm, here's what you can  do:  · Talk about it- your feelings and reasons for harming yourself  · Remove any means that you might use to hurt yourself (examples: pills, rope, extension cords, firearm)  · Get professional help from the community (Mental Health, Substance Abuse, psychological counseling)  · Do not be alone:Call your Safe Contact- someone whom you trust who will be there for you.  · Call your local CRISIS HOTLINE 201-5034 or 150-663-4229  · Call your local Children's Mobile Crisis Response Team [place] (108) 031-7305 or www.L[employer]  · Call the toll free National Suicide Prevention Hotlines   · National Suicide Prevention Lifeline 224-429-CMMG (2775)  · National Hope Line Network 800-SUICIDE (536-6586)

## 2020-06-12 NOTE — OP REPORT
DATE OF OPERATION: 2020    PREOPERATIVE DIAGNOSES:   1. Left hip pain.   2. Left hip Femoral Acetabular Impingement  3. Left hip Acetabular osseous metaplasia    POSTOPERATIVE DIAGNOSES:   1. Left hip pain.   2. Left hip Femoral Acetabular Impingement impingement  3. Left hip Acetabular osseous metaplasia    PROCEDURES:   1. Left hip arthroscopic debridement and chondroplasty.   2. Left hip arthroscopic peripheral compartment synovectomy.   3. Left hip femoral neck osteoplasty  4. Left hip acetabular ostoplasty       SURGEON: Surinder Dykes MD.   ASSISTANT: Jewel PUENTES    ANESTHESIA:  .General  ANESTHESIOLOGIST:Dr Carreon  COMPLICATIONS: None.   WEIGHTBEARIN%.     FINDINGS:   1. Stable femoral osteoplasty.   2. Stable acetabular osteoplasty  3.  Intact articular cartilage with small ant delamination    PROCEDURE IN DETAIL: The patient was taken to the operating room and then underwent a general anesthetic in the supine position. Bilateral legs placed   into traction on the [place] table with the left first, then the right, traction   post eccentric into the left thigh.  The left leg was addressed with   Hibiclens, alcohol and ChloraPrep, then the field draped using sterile technique. A timeout was undertaken and noted. The surgery was initiated with gentle distraction of   the right hip. Placement of a pertrochanteric guidewire followed by an   anterolateral under direct visualization. Diagnostic arthroscopy was   Undertaken.     The lateral and anterior  labrum had degenerative tearing and was replaced with osseous metaplasia, this was taken down with a combination of punches and motorized shelbi and burs.  Decompression and acetabular osteoplasty of the anterior,  Anterolateral,  lateral and posterior lateral   acetabulum was completed with a stable decompression to return to a more normal anatomic positioning.  The remnant articular surfaces were utilized as a remnant labrum to remain in place.   Fluoroscopic and direct localization were undertaken to obtain this.       As traction was let off, a distal anterolateral portal was made. The cannulated guide system was utilized to obtain access to the peripheral compartment. Blunt dissection was undertaken. Blade capsular incision followed with utilization of the shaver to perform a peripheral compartment synovectomy. And the head-neck osteoplasty undertaken starting first posterolaterally and then laterally and then anterolaterally.     A stable decompression was accomplished under direct and fluoroscopic visualization and range of motion.   Final images were obtained, a stable decompression noted. Thorough   irrigation followed with periarticular anesthetization with ropivacaine and   closure of the incisions with nylon. The sterile bandage was applied. The   patient awoken from her anesthetic and taken to the recovery room, tolerated   the procedure very well with no signs of complications.

## 2020-06-12 NOTE — ANESTHESIA POSTPROCEDURE EVALUATION
Patient: Harlan Medina    Procedure Summary     Date:  06/12/20 Room / Location:   OR 06 / SURGERY [facility]    Anesthesia Start:  1141 Anesthesia Stop:  1344    Procedures:       ARTHROSCOPY, HIP - WITH DEBRIDEMENT, SYNOVECTOMY (Left Hip)      OSTEOPLASTY, ACETABULUM (Left Hip)      OSTEOPLASTY, FEMUR, NECK - AND GOLDBERG (Left Hip) Diagnosis:  (FEMORAL ACETABULAR IMPINGEMENT)    Surgeon:  Surinder Dykes M.D. Responsible Provider:  Dave Carreon M.D.    Anesthesia Type:  general ASA Status:  2          Final Anesthesia Type: general  Last vitals  BP   Blood Pressure: 123/79    Temp   (!) 2.4 °C (36.3 °F)    Pulse   Pulse: 71   Resp   18    SpO2   95 %      Anesthesia Post Evaluation    Patient location during evaluation: PACU  Patient participation: complete - patient participated  Level of consciousness: awake and alert  Pain score: 3    Airway patency: patent  Anesthetic complications: no  Cardiovascular status: hemodynamically stable  Respiratory status: acceptable  Hydration status: euvolemic    PONV: none           Nurse Pain Score: 3 (NPRS)

## 2020-06-12 NOTE — ANESTHESIA TIME REPORT
Anesthesia Start and Stop Event Times     Date Time Event    6/12/2020 1141 Anesthesia Start     1344 Anesthesia Stop        Responsible Staff  06/12/20    Name Role Begin End    Dave Carreon M.D. Anesth 1141 1344        Preop Diagnosis (Free Text):  Pre-op Diagnosis     FEMORAL ACETABULAR IMPINGEMENT        Preop Diagnosis (Codes):    Post op Diagnosis  Femoral acetabular impingement      Premium Reason  Non-Premium    Comments:

## 2020-08-13 ENCOUNTER — OFFICE VISIT (OUTPATIENT)
Dept: PHYSICAL MEDICINE AND REHAB | Facility: MEDICAL CENTER | Age: 50
End: 2020-08-13
Payer: COMMERCIAL

## 2020-08-13 VITALS
WEIGHT: 254.63 LBS | OXYGEN SATURATION: 96 % | TEMPERATURE: 97.1 F | BODY MASS INDEX: 34.49 KG/M2 | HEART RATE: 74 BPM | DIASTOLIC BLOOD PRESSURE: 70 MMHG | SYSTOLIC BLOOD PRESSURE: 112 MMHG | HEIGHT: 72 IN

## 2020-08-13 DIAGNOSIS — M25.852 HIP IMPINGEMENT SYNDROME, LEFT: ICD-10-CM

## 2020-08-13 DIAGNOSIS — M25.552 CHRONIC LEFT HIP PAIN: ICD-10-CM

## 2020-08-13 DIAGNOSIS — M16.10 ARTHRITIS OF HIP: ICD-10-CM

## 2020-08-13 DIAGNOSIS — G89.29 CHRONIC BILATERAL LOW BACK PAIN WITHOUT SCIATICA: ICD-10-CM

## 2020-08-13 DIAGNOSIS — M54.50 CHRONIC BILATERAL LOW BACK PAIN WITHOUT SCIATICA: ICD-10-CM

## 2020-08-13 DIAGNOSIS — G89.29 CHRONIC LEFT HIP PAIN: ICD-10-CM

## 2020-08-13 DIAGNOSIS — M47.816 LUMBAR SPONDYLOSIS: ICD-10-CM

## 2020-08-13 PROCEDURE — 99214 OFFICE O/P EST MOD 30 MIN: CPT | Performed by: PHYSICAL MEDICINE & REHABILITATION

## 2020-08-13 ASSESSMENT — PATIENT HEALTH QUESTIONNAIRE - PHQ9: CLINICAL INTERPRETATION OF PHQ2 SCORE: 0

## 2020-08-13 ASSESSMENT — PAIN SCALES - GENERAL: PAINLEVEL: 3=SLIGHT PAIN

## 2020-08-13 NOTE — PROGRESS NOTES
Follow up patient note  Interventional spine and sports physiatry, Physical medicine rehabilitation      Chief complaint:   Chief Complaint   Patient presents with   • Follow-Up     Hip pain          HISTORY    Please see new patient note by Dr Long,  for more details.     HPI  Patient identification: Harlan Medina 50 y.o. male  With Diagnoses of Hip impingement syndrome, left, Arthritis of hip, Chronic left hip pain, Chronic bilateral low back pain without sciatica, however the majority the patient's back pain is likely coming from his hip, and Lumbar spondylosis however the majority the patient's back pain is likely coming from his hip were pertinent to this visit.     Procedures:  1/20/2020 diagnostic and therapeutic left intra-articular hip injection with fluoroscopic guidance    The patient is now status post surgery from Dr. Surinder Dykes.  I reviewed the operative notes and some of the outside notes from Dr. Dykes's office.  The patient had left hip arthroscopic debridement and chondroplasty as well as peripheral compartment synovectomy, femoral neck osteoplasty, acetabular osteoplasty of the left hip on 6/12/2020.  Since that point the patient's pain in his hips and his low back has improved significantly.  His low back pain is stable and he has minimal pain in his low back.  His hip pain is 3 out of 10 in intensity.  Previously this was 6-8/10 in intensity.  He still does have some stiffness in the hip but he is working through this.  He is previously going to physical therapy and now is doing more of a home-based program.  He is swimming at least 3 times weekly as well as going on walks several times per week.  The patient has titrated off of his pain medications and is doing quite well       ROS Red Flags :   Fever, Chills, Sweats: Denies  Involuntary Weight Loss: Denies  Bowel/Bladder Incontinence: Denies  Saddle Anesthesia: Denies        PMHx:   Past Medical History:   Diagnosis Date   •  Diabetes ([facility]) 06/2020    Type 2, no insulin   • LLQ abdominal pain 3/8/2014    Intermittent constipation x 1 year Will take otc meds to make him go No hematuria, blood in stool Not peeing; weak, hesitant stream at times, not much urine Feels need to move bowels, then can't  No large/hard or small/pebble - when he does go it's mushy and not much Prior to a year ago - normal 2/day BM No fam h/o colon ca   • Renal disorder 2006    Kidney stones   • Tobacco dependence 1/14/2016       PSHx:   Past Surgical History:   Procedure Laterality Date   • HIP ARTHROSCOPY Left 6/12/2020    Procedure: ARTHROSCOPY, HIP - WITH DEBRIDEMENT, SYNOVECTOMY;  Surgeon: Surinder Dykes M.D.;  Location: [facility];  Service: Orthopedics   • ACETABULAR OSTEOPLASTY Left 6/12/2020    Procedure: OSTEOPLASTY, ACETABULUM;  Surgeon: Surinder Dykes M.D.;  Location: [facility];  Service: Orthopedics   • FEMORAL NECK OSTEOPLASTY Left 6/12/2020    Procedure: OSTEOPLASTY, FEMUR, NECK - AND GOLDBERG;  Surgeon: Surinder Dykes M.D.;  Location: SURGERY [facility];  Service: Orthopedics   • APPENDECTOMY  1988       Family history   Family History   Problem Relation Age of Onset   • Hypertension Father    • Diabetes Father    • Lung Disease Neg Hx    • Cancer Neg Hx    • Heart Disease Neg Hx    • Stroke Neg Hx          Medications:   Outpatient Medications Marked as Taking for the 8/13/20 encounter (Office Visit) with Donald Long M.D.   Medication Sig Dispense Refill   • metFORMIN (GLUCOPHAGE) 500 MG Tab Take 500 mg by mouth every day.          Current Outpatient Medications on File Prior to Visit   Medication Sig Dispense Refill   • metFORMIN (GLUCOPHAGE) 500 MG Tab Take 500 mg by mouth every day.       No current facility-administered medications on file prior to visit.          Allergies:   No Known Allergies    Social Hx:   Social History     Socioeconomic History   • Marital status:      Spouse name: Not  on file   • Number of children: Not on file   • Years of education: Not on file   • Highest education level: Not on file   Occupational History   • Not on file   Social Needs   • Financial resource strain: Not on file   • Food insecurity     Worry: Not on file     Inability: Not on file   • Transportation needs     Medical: Not on file     Non-medical: Not on file   Tobacco Use   • Smoking status: Never Smoker   • Smokeless tobacco: Current User     Types: Chew   • Tobacco comment: one can a day    Substance and Sexual Activity   • Alcohol use: Yes     Alcohol/week: 0.0 oz     Comment: 9-10 per week   • Drug use: No   • Sexual activity: Yes     Partners: Female   Lifestyle   • Physical activity     Days per week: Not on file     Minutes per session: Not on file   • Stress: Not on file   Relationships   • Social connections     Talks on phone: Not on file     Gets together: Not on file     Attends Voodoo service: Not on file     Active member of club or organization: Not on file     Attends meetings of clubs or organizations: Not on file     Relationship status: Not on file   • Intimate partner violence     Fear of current or ex partner: Not on file     Emotionally abused: Not on file     Physically abused: Not on file     Forced sexual activity: Not on file   Other Topics Concern   •  Service No   • Blood Transfusions No   • Caffeine Concern No   • Occupational Exposure No   • Hobby Hazards No   • Sleep Concern No   • Stress Concern No   • Weight Concern Yes   • Special Diet No   • Back Care No   • Exercise Yes   • Bike Helmet No   • Seat Belt No   • Self-Exams No   Social History Narrative   • Not on file         EXAMINATION     Physical Exam:   Vitals: /70 (BP Location: Left arm, Patient Position: Sitting, BP Cuff Size: Adult)   Pulse 74   Temp 36.2 °C (97.1 °F) (Temporal)   Ht 1.829 m (6')   Wt 115.5 kg (254 lb 10.1 oz)   SpO2 96%     Constitutional:   Body Habitus: Body mass index is 34.53  kg/m².  Cooperation: Fully cooperates with exam  Appearance: Well-groomed no disheveled    Respiratory-  breathing comfortable on room air, no audible wheezing  Cardiovascular- capillary refills less than 2 seconds. No lower extremity edema is noted.   Psychiatric- alert and oriented ×3. Normal affect.    MSK and Neuro: -    Exam done 8/13/2020   Decreased range of motion in the hips but FAIRs maneuver is negative bilaterally Carline's maneuver negative bilaterally    Palpation:   No tenderness to palpation in midline at T1-T12 levels. No tenderness to palpation in the left and right of the midline T1-L5, NEGATIVE for tenderness to palpation to the para-midline region in the lower lumbar levels.  palpation over SI joint: negative bilaterally    palpation in hip or over the greater trochanters: Positive left, negative right  Positive for trigger points in the left lower lumbar paraspinous muscles, quadratus lumborum and gluteus beni    Lumbar spine Special tests  Neuro tension  Straight leg test negative bilaterally    Slump test negative bilaterally      Key points for the international standards for neurological classification of spinal cord injury (ISNCSCI) to light touch.     Dermatome R L                                      L2 2 2   L3 2 2   L4 2 2   L5 2 2   S1 2 2   S2 2 2         Motor Exam Lower Extremities    ? Myotome R L   Hip flexion L2 5 5   Knee extension L3 5 5   Ankle dorsiflexion L4 5 5   Toe extension L5 5 5   Ankle plantarflexion S1 5 5   }              MEDICAL DECISION MAKING    DATA    Labs:   Lab Results   Component Value Date/Time    SODIUM 139 06/03/2020 08:51 AM    POTASSIUM 4.0 06/03/2020 08:51 AM    CHLORIDE 104 06/03/2020 08:51 AM    CO2 23 06/03/2020 08:51 AM    GLUCOSE 128 (H) 06/03/2020 08:51 AM    BUN 13 06/03/2020 08:51 AM    CREATININE 0.97 06/03/2020 08:51 AM    BUNCREATRAT 12 03/14/2014 12:00 AM        No results found for: PROTHROMBTM, INR     Lab Results   Component Value  Date/Time    WBC 5.6 07/09/2019 10:32 PM    RBC 5.18 07/09/2019 10:32 PM    HEMOGLOBIN 15.8 07/09/2019 10:32 PM    HEMATOCRIT 47.3 07/09/2019 10:32 PM    MCV 91.3 07/09/2019 10:32 PM    MCH 30.5 07/09/2019 10:32 PM    MCHC 33.4 (L) 07/09/2019 10:32 PM    MPV 12.9 07/09/2019 10:32 PM    NEUTSPOLYS 66.80 07/09/2019 10:32 PM    LYMPHOCYTES 22.90 07/09/2019 10:32 PM    MONOCYTES 7.30 07/09/2019 10:32 PM    EOSINOPHILS 2.10 07/09/2019 10:32 PM    BASOPHILS 0.70 07/09/2019 10:32 PM        Lab Results   Component Value Date/Time    HBA1C 6.7 (A) 04/09/2020 01:15 PM          Imaging:   I personally reviewed following images    Fluoroscopic images from 1/20/2020 showing successful left hip injection    I reviewed the following radiology reports                                        Results for orders placed during the hospital encounter of 11/07/19   MR-LUMBAR SPINE-W/O    Impression 1.  Stable mild lumbar spondylosis as detailed including small posterior central disc protrusion at L4-L5.  2.  No significant final or foraminal stenosis.                  Results for orders placed during the hospital encounter of 11/07/19   MR-LUMBAR SPINE-W/O    Impression 1.  Stable mild lumbar spondylosis as detailed including small posterior central disc protrusion at L4-L5.  2.  No significant final or foraminal stenosis.                                                                                                                             Results for orders placed during the hospital encounter of 05/03/18   CT-ABDOMEN-PELVIS WITH    Impression 1.  Diverticulosis without evidence of diverticulitis.    2.  Fatty infiltration of the liver.    3.  Small low-attenuation lesions in the kidneys which are too small to clearly characterize though likely represent cysts.                                                                                 Results for orders placed during the hospital encounter of 11/07/19   DX-LUMBAR SPINE-4+ VIEWS     Impression Mild multilevel degenerative disc disease and facet degeneration.                                         DIAGNOSIS   Visit Diagnoses     ICD-10-CM   1. Hip impingement syndrome, left  M25.852   2. Arthritis of hip  M16.10   3. Chronic left hip pain  M25.552    G89.29   4. Chronic bilateral low back pain without sciatica, however the majority the patient's back pain is likely coming from his hip  M54.5    G89.29   5. Lumbar spondylosis however the majority the patient's back pain is likely coming from his hip  M47.816         ASSESSMENT and PLAN:     Harlan Turner Jr Medina 50 y.o. male      Harlan was seen today for follow-up.    Diagnoses and all orders for this visit:    Hip impingement syndrome, left    Arthritis of hip    Chronic left hip pain    Chronic bilateral low back pain without sciatica, however the majority the patient's back pain is likely coming from his hip    Lumbar spondylosis however the majority the patient's back pain is likely coming from his hip      The patient is doing quite well after his left hip surgery done by Dr. Surinder Dykes in June 2020.  He is recovering from this and has had improvement in his pain in his back pain is improved dramatically as well.  I believe the majority of his back pain is coming from his hip.  We discussed the importance of his home exercise program with daily stretching strengthening exercises.  We discussed not pushing past to the point where there could be damage because but gentle stretching and exercises should be done daily.    The patient can use meloxicam as needed but we discussed not using this daily.    Follow up: 2 to 3 months    Thank you for allowing me to participate in the care of this patient. If you have any questions please not hesitate to contact me.          Please note that this dictation was created using voice recognition software. I have made every reasonable attempt to correct obvious errors but there may be errors of  grammar and content that I may have overlooked prior to finalization of this note.      Donald Long MD  Interventional Spine and Sports Physiatry  Physical Medicine and Rehabilitation  [facility]

## 2020-09-10 DIAGNOSIS — E11.9 CONTROLLED TYPE 2 DIABETES MELLITUS WITHOUT COMPLICATION, WITHOUT LONG-TERM CURRENT USE OF INSULIN (HCC): ICD-10-CM

## 2020-09-11 RX ORDER — METFORMIN HYDROCHLORIDE 500 MG/1
TABLET, EXTENDED RELEASE ORAL
Qty: 30 TAB | Refills: 3 | Status: SHIPPED | OUTPATIENT
Start: 2020-09-11 | End: 2020-10-12 | Stop reason: SDUPTHER

## 2020-10-12 ENCOUNTER — OFFICE VISIT (OUTPATIENT)
Dept: MEDICAL GROUP | Facility: LAB | Age: 50
End: 2020-10-12
Payer: COMMERCIAL

## 2020-10-12 VITALS
DIASTOLIC BLOOD PRESSURE: 64 MMHG | BODY MASS INDEX: 34 KG/M2 | SYSTOLIC BLOOD PRESSURE: 118 MMHG | WEIGHT: 251 LBS | OXYGEN SATURATION: 95 % | HEIGHT: 72 IN | TEMPERATURE: 97.7 F | RESPIRATION RATE: 16 BRPM | HEART RATE: 56 BPM

## 2020-10-12 DIAGNOSIS — G89.29 CHRONIC LEFT HIP PAIN: ICD-10-CM

## 2020-10-12 DIAGNOSIS — M25.552 CHRONIC LEFT HIP PAIN: ICD-10-CM

## 2020-10-12 DIAGNOSIS — G89.29 CHRONIC BILATERAL LOW BACK PAIN WITHOUT SCIATICA: ICD-10-CM

## 2020-10-12 DIAGNOSIS — E11.9 CONTROLLED TYPE 2 DIABETES MELLITUS WITHOUT COMPLICATION, WITHOUT LONG-TERM CURRENT USE OF INSULIN (HCC): ICD-10-CM

## 2020-10-12 DIAGNOSIS — M54.50 CHRONIC BILATERAL LOW BACK PAIN WITHOUT SCIATICA: ICD-10-CM

## 2020-10-12 DIAGNOSIS — Z00.00 PREVENTATIVE HEALTH CARE: ICD-10-CM

## 2020-10-12 DIAGNOSIS — E66.9 OBESITY (BMI 30-39.9): ICD-10-CM

## 2020-10-12 LAB
HBA1C MFR BLD: 5.9 % (ref 0–5.6)
INT CON NEG: NEGATIVE
INT CON POS: POSITIVE

## 2020-10-12 PROCEDURE — 83036 HEMOGLOBIN GLYCOSYLATED A1C: CPT | Performed by: NURSE PRACTITIONER

## 2020-10-12 PROCEDURE — 99214 OFFICE O/P EST MOD 30 MIN: CPT | Performed by: NURSE PRACTITIONER

## 2020-10-12 RX ORDER — METFORMIN HYDROCHLORIDE 500 MG/1
500 TABLET, EXTENDED RELEASE ORAL
Qty: 90 TAB | Refills: 3 | Status: SHIPPED | OUTPATIENT
Start: 2020-10-12 | End: 2021-10-21

## 2020-10-12 NOTE — ASSESSMENT & PLAN NOTE
Chronic but improved with surgery through Dr. Dykes 6/2020.  Now has right sided low back pain but wants to wait a few months to do right side.

## 2020-10-12 NOTE — ASSESSMENT & PLAN NOTE
Chronic issue.  Has cut out a lot of carbs in the past 3 months.  + weight loss - 20 lbs since 4/2020.  Feeling good.  More physically active in his job. Denies foot numbness / tingling.  Denies urinary frequency / dysuria.  Drinking 1 gallon of water per day.  Denies bowel issues.

## 2020-10-12 NOTE — PROGRESS NOTES
Chief Complaint   Patient presents with   • Diabetes       HPI   Matthias is a 50-year-old established male here to follow-up on chronic issues:    #1- Controlled type 2 diabetes mellitus without complication, without long-term current use of insulin (HCC)  Chronic issue.  Has cut out a lot of carbs in the past 3 months.  + weight loss - 20 lbs since 4/2020.  Feeling good.  More physically active in his job. Denies foot numbness / tingling.  Denies urinary frequency / dysuria.  Drinking 1 gallon of water per day.  Denies bowel issues.  Taking metformin 500 mg XR once at night and denies side effects.      #2- Chronic left hip pain  Chronic but improved with left hip surgery through Dr. Dykes 6/2020.  Now has right sided low back pain but wants to wait a few months to do right hip surgery to help with this.       #3- Chronic bilateral low back pain without sciatica  Chronic issue.  Pending right hip surgery with Dr. Dykes which took care of left side pain as above.     #4- obesity:   Chronic issue but as in #1, improving with dietary changes and increase physical activity.  Has lost about 20 pounds in the last 8 months.  Goal of losing another 20 pounds.    Past medical, surgical, family, and social history is reviewed and updated in Epic chart by me today.   Medications and allergies reviewed and updated in Epic chart by me today.     ROS:   As documented in history of present illness above    Exam:  /64   Pulse (!) 56   Temp 36.5 °C (97.7 °F)   Resp 16   Ht 1.829 m (6')   Wt 113.9 kg (251 lb)   SpO2 95%   Gen. appears healthy in no distress   Skin appropriate for sex and age   Neck trachea is midline  Lungs unlabored breathing  Heart regular rate  Neuro gait and station normal   Psych appropriate, calm, interactive, well-groomed    Assessment / Plan / Medical Decision making:   \  1. Controlled type 2 diabetes mellitus without complication, without long-term current use of insulin (HCC)  metFORMIN ER  (GLUCOPHAGE XR) 500 MG TABLET SR 24 HR    HEMOGLOBIN A1C    MICROALBUMIN CREAT RATIO URINE    POCT Hemoglobin A1C   2. Chronic left hip pain     3. Chronic bilateral low back pain without sciatica     4. Obesity (BMI 30-39.9)     5. Preventative health care  Comp Metabolic Panel    CBC WITH DIFFERENTIAL    Lipid Profile    PROSTATE SPECIFIC AG SCREENING     A1c has significantly improved down to 5.8% from 6.7.  He will continue on his current diabetic diet, exercise and weight loss program.  Recheck 6 months.  He is also feeling well in terms of recovering from hip surgery and looking forward to his other hip to decrease his back pain.  We also discussed the benefit of further weight loss and decreasing his hip and back pain.  Usually discussed goal of losing another 50 pounds over the next 1 to 2 years.  Colonoscopy was done in 2014 which showed a 1 mm polyp and he states he was told to recheck this in 10 years.  I encouraged him to confirm this with GI consultants.  Offered Shingrix and influenza, both of which he declines.  Follow-up 6 months.

## 2020-10-22 ENCOUNTER — APPOINTMENT (OUTPATIENT)
Dept: PHYSICAL MEDICINE AND REHAB | Facility: MEDICAL CENTER | Age: 50
End: 2020-10-22

## 2021-03-05 ENCOUNTER — PATIENT MESSAGE (OUTPATIENT)
Dept: MEDICAL GROUP | Facility: LAB | Age: 51
End: 2021-03-05

## 2021-03-05 DIAGNOSIS — R79.89 LOW TESTOSTERONE: ICD-10-CM

## 2021-03-05 DIAGNOSIS — Z00.00 PREVENTATIVE HEALTH CARE: ICD-10-CM

## 2021-03-05 NOTE — TELEPHONE ENCOUNTER
From: Harlan Medina  To: Nurse Practicioner Leonor Jaffe  Sent: 3/5/2021 8:27 AM PST  Subject: Non-Urgent Medical Question    Hi I have to do my blood work before my appointment on April 5th can you please send a request to test my testosterone please. I’m getting hot flashes so want to see if my levels are low. Thank you

## 2021-03-31 ENCOUNTER — NON-PROVIDER VISIT (OUTPATIENT)
Dept: MEDICAL GROUP | Facility: CLINIC | Age: 51
End: 2021-03-31
Payer: COMMERCIAL

## 2021-03-31 ENCOUNTER — HOSPITAL ENCOUNTER (OUTPATIENT)
Facility: MEDICAL CENTER | Age: 51
End: 2021-03-31
Attending: NURSE PRACTITIONER
Payer: COMMERCIAL

## 2021-03-31 PROCEDURE — 82043 UR ALBUMIN QUANTITATIVE: CPT

## 2021-03-31 PROCEDURE — 36415 COLL VENOUS BLD VENIPUNCTURE: CPT | Performed by: PHYSICIAN ASSISTANT

## 2021-03-31 PROCEDURE — 82570 ASSAY OF URINE CREATININE: CPT

## 2021-03-31 PROCEDURE — 84153 ASSAY OF PSA TOTAL: CPT

## 2021-03-31 PROCEDURE — 80061 LIPID PANEL: CPT

## 2021-03-31 PROCEDURE — 80053 COMPREHEN METABOLIC PANEL: CPT

## 2021-03-31 PROCEDURE — 85025 COMPLETE CBC W/AUTO DIFF WBC: CPT

## 2021-03-31 PROCEDURE — 83036 HEMOGLOBIN GLYCOSYLATED A1C: CPT

## 2021-04-01 DIAGNOSIS — Z00.00 PREVENTATIVE HEALTH CARE: ICD-10-CM

## 2021-04-01 DIAGNOSIS — E11.9 CONTROLLED TYPE 2 DIABETES MELLITUS WITHOUT COMPLICATION, WITHOUT LONG-TERM CURRENT USE OF INSULIN (HCC): ICD-10-CM

## 2021-04-01 LAB
ALBUMIN SERPL BCP-MCNC: 4.3 G/DL (ref 3.2–4.9)
ALBUMIN/GLOB SERPL: 1.8 G/DL
ALP SERPL-CCNC: 63 U/L (ref 30–99)
ALT SERPL-CCNC: 25 U/L (ref 2–50)
ANION GAP SERPL CALC-SCNC: 9 MMOL/L (ref 7–16)
AST SERPL-CCNC: 23 U/L (ref 12–45)
BASOPHILS # BLD AUTO: 0.5 % (ref 0–1.8)
BASOPHILS # BLD: 0.03 K/UL (ref 0–0.12)
BILIRUB SERPL-MCNC: 0.3 MG/DL (ref 0.1–1.5)
BUN SERPL-MCNC: 15 MG/DL (ref 8–22)
CALCIUM SERPL-MCNC: 8.7 MG/DL (ref 8.5–10.5)
CHLORIDE SERPL-SCNC: 103 MMOL/L (ref 96–112)
CHOLEST SERPL-MCNC: 177 MG/DL (ref 100–199)
CO2 SERPL-SCNC: 24 MMOL/L (ref 20–33)
CREAT SERPL-MCNC: 0.78 MG/DL (ref 0.5–1.4)
CREAT UR-MCNC: 178.13 MG/DL
EOSINOPHIL # BLD AUTO: 0.09 K/UL (ref 0–0.51)
EOSINOPHIL NFR BLD: 1.5 % (ref 0–6.9)
ERYTHROCYTE [DISTWIDTH] IN BLOOD BY AUTOMATED COUNT: 40.6 FL (ref 35.9–50)
EST. AVERAGE GLUCOSE BLD GHB EST-MCNC: 137 MG/DL
GLOBULIN SER CALC-MCNC: 2.4 G/DL (ref 1.9–3.5)
GLUCOSE SERPL-MCNC: 142 MG/DL (ref 65–99)
HBA1C MFR BLD: 6.4 % (ref 4–5.6)
HCT VFR BLD AUTO: 46.3 % (ref 42–52)
HDLC SERPL-MCNC: 40 MG/DL
HGB BLD-MCNC: 15.2 G/DL (ref 14–18)
IMM GRANULOCYTES # BLD AUTO: 0.03 K/UL (ref 0–0.11)
IMM GRANULOCYTES NFR BLD AUTO: 0.5 % (ref 0–0.9)
LDLC SERPL CALC-MCNC: 93 MG/DL
LYMPHOCYTES # BLD AUTO: 1.3 K/UL (ref 1–4.8)
LYMPHOCYTES NFR BLD: 21.4 % (ref 22–41)
MCH RBC QN AUTO: 30 PG (ref 27–33)
MCHC RBC AUTO-ENTMCNC: 32.8 G/DL (ref 33.7–35.3)
MCV RBC AUTO: 91.3 FL (ref 81.4–97.8)
MICROALBUMIN UR-MCNC: <1.2 MG/DL
MICROALBUMIN/CREAT UR: NORMAL MG/G (ref 0–30)
MONOCYTES # BLD AUTO: 0.44 K/UL (ref 0–0.85)
MONOCYTES NFR BLD AUTO: 7.2 % (ref 0–13.4)
NEUTROPHILS # BLD AUTO: 4.18 K/UL (ref 1.82–7.42)
NEUTROPHILS NFR BLD: 68.9 % (ref 44–72)
NRBC # BLD AUTO: 0.03 K/UL
NRBC BLD-RTO: 0.5 /100 WBC
PLATELET # BLD AUTO: 145 K/UL (ref 164–446)
PMV BLD AUTO: 13.2 FL (ref 9–12.9)
POTASSIUM SERPL-SCNC: 4.6 MMOL/L (ref 3.6–5.5)
PROT SERPL-MCNC: 6.7 G/DL (ref 6–8.2)
PSA SERPL-MCNC: 0.65 NG/ML (ref 0–4)
RBC # BLD AUTO: 5.07 M/UL (ref 4.7–6.1)
SODIUM SERPL-SCNC: 136 MMOL/L (ref 135–145)
TRIGL SERPL-MCNC: 220 MG/DL (ref 0–149)
WBC # BLD AUTO: 6.1 K/UL (ref 4.8–10.8)

## 2021-04-02 ENCOUNTER — TELEPHONE (OUTPATIENT)
Dept: MEDICAL GROUP | Facility: LAB | Age: 51
End: 2021-04-02

## 2021-04-02 NOTE — TELEPHONE ENCOUNTER
ANNUAL WELLNESS VISIT PRE-VISIT PLANNING    1.  Reviewed notes from the last office visit: Yes    2.  If any orders were ordered or intended to be done prior to visit (i.e. 6 mos follow-up), do we have results/consult notes or has patient scheduled?        •  Labs - Labs ordered, completed on 4/1/2021 and results are in chart.  Note: If patient appointment is for lab review and patient did not complete labs, check with provider if OK to reschedule patient until labs completed.       •  Imaging - Imaging was not ordered at last office visit.       •  Referrals - No referrals were ordered at last office visit.    3.  Immunizations were updated in Epic using Reconcile Outside Information activity? Yes    4.  Patient is due for the following Health Maintenance Topics:   Health Maintenance Due   Topic Date Due   • IMM PNEUMOCOCCAL VACCINE: 0-64 Years (1 of 1 - PPSV23) Never done   • COVID-19 Vaccine (1) Never done   • RETINAL SCREENING  Never done   • IMM HEP B VACCINE (1 of 3 - Risk 3-dose series) Never done   • IMM DTaP/Tdap/Td Vaccine (1 - Tdap) Never done   • IMM ZOSTER VACCINES (1 of 2) Never done   • DIABETES MONOFILAMENT / LE EXAM  04/09/2021       5.  Reviewed/Updated the following with patient:       •   Preferred Pharmacy? Yes       •   Preferred Lab? Yes       •   Preferred Communication? Yes       •   Allergies? Yes       •   Medications? YES. Was Abstract Encounter opened and chart updated? YES    6.  Care Team Updated:       •   DME Company (gait device, O2, CPAP, etc.): N\A       •   Other Specialists (eye doctor, derm, GYN, cardiology, endo, etc): N\A    7.  Patient was advised: “This is a free wellness visit. The provider will screen for medical conditions to help you stay healthy. If you have other concerns to address you may be asked to discuss these at a separate visit or there may be an additional fee.”     8.  AHA (Puls8) form printed for Provider? N/A

## 2021-04-05 ENCOUNTER — OFFICE VISIT (OUTPATIENT)
Dept: MEDICAL GROUP | Facility: LAB | Age: 51
End: 2021-04-05
Payer: COMMERCIAL

## 2021-04-05 VITALS
WEIGHT: 265 LBS | SYSTOLIC BLOOD PRESSURE: 120 MMHG | BODY MASS INDEX: 35.89 KG/M2 | TEMPERATURE: 97.6 F | RESPIRATION RATE: 16 BRPM | HEART RATE: 60 BPM | HEIGHT: 72 IN | OXYGEN SATURATION: 95 % | DIASTOLIC BLOOD PRESSURE: 70 MMHG

## 2021-04-05 DIAGNOSIS — Z00.00 WELL ADULT EXAM: ICD-10-CM

## 2021-04-05 DIAGNOSIS — R53.83 OTHER FATIGUE: ICD-10-CM

## 2021-04-05 DIAGNOSIS — Z23 NEED FOR SHINGLES VACCINE: ICD-10-CM

## 2021-04-05 DIAGNOSIS — E11.9 CONTROLLED TYPE 2 DIABETES MELLITUS WITHOUT COMPLICATION, WITHOUT LONG-TERM CURRENT USE OF INSULIN (HCC): ICD-10-CM

## 2021-04-05 DIAGNOSIS — Z23 NEED FOR PNEUMOCOCCAL VACCINATION: ICD-10-CM

## 2021-04-05 PROCEDURE — 90472 IMMUNIZATION ADMIN EACH ADD: CPT | Performed by: NURSE PRACTITIONER

## 2021-04-05 PROCEDURE — 99396 PREV VISIT EST AGE 40-64: CPT | Mod: 25 | Performed by: NURSE PRACTITIONER

## 2021-04-05 PROCEDURE — 90732 PPSV23 VACC 2 YRS+ SUBQ/IM: CPT | Performed by: NURSE PRACTITIONER

## 2021-04-05 PROCEDURE — 90471 IMMUNIZATION ADMIN: CPT | Performed by: NURSE PRACTITIONER

## 2021-04-05 PROCEDURE — 90750 HZV VACC RECOMBINANT IM: CPT | Performed by: NURSE PRACTITIONER

## 2021-04-05 ASSESSMENT — PATIENT HEALTH QUESTIONNAIRE - PHQ9: CLINICAL INTERPRETATION OF PHQ2 SCORE: 0

## 2021-04-05 ASSESSMENT — FIBROSIS 4 INDEX: FIB4 SCORE: 1.62

## 2021-04-05 NOTE — ASSESSMENT & PLAN NOTE
Chronic issue.  Taking Metformin 500 mg at night with dinner.    Component      Latest Ref Rng & Units 7/9/2019 10/14/2019 4/9/2020 10/12/2020          10:32 PM  8:04 AM  1:15 PM  9:30 AM   Glycohemoglobin      4.0 - 5.6 % 7.4 (H) 6.9 (A) 6.7 (A) 5.9 (A)   Internal Control Negative        Negative Negative Negative   Internal Control Positive        Positive Positive Positive   Estim. Avg Glu      mg/dL 166        Component      Latest Ref Rng & Units 3/31/2021           7:40 AM   Glycohemoglobin      4.0 - 5.6 % 6.4 (H)   Internal Control Negative          Internal Control Positive          Estim. Avg Glu      mg/dL 137

## 2021-04-05 NOTE — PROGRESS NOTES
Chief Complaint   Patient presents with   • Annual Exam       HPI  Matthias is a 50 yo est male here for annual exam.  Feeling well.  Working a lot and is tired frequently.  Looking forward to [place] to [facility] in about a [place].  Is not vaccinated for Covid yet.  Denies bowel or bladder problems.  Denies chest pain or trouble breathing.  Did labs prior to arrival, significant for slightly low platelet count, A1c up to 6.4, otherwise normal.  Also has controlled type 2 diabetes mellitus.  Chronic issue.  Taking Metformin 500 mg at night with dinner.   Denies foot numbness / tingling / wounds.   Denies urinary issues.     Component      Latest Ref Rng & Units 7/9/2019 10/14/2019 4/9/2020 10/12/2020          10:32 PM  8:04 AM  1:15 PM  9:30 AM   Glycohemoglobin      4.0 - 5.6 % 7.4 (H) 6.9 (A) 6.7 (A) 5.9 (A)   Internal Control Negative        Negative Negative Negative   Internal Control Positive        Positive Positive Positive   Estim. Avg Glu      mg/dL 166        Component      Latest Ref Rng & Units 3/31/2021           7:40 AM   Glycohemoglobin      4.0 - 5.6 % 6.4 (H)   Internal Control Negative          Internal Control Positive          Estim. Avg Glu      mg/dL 137         Past medical, surgical, family, and social history is reviewed and updated in Epic chart by me today.   Medications and allergies reviewed and updated in Epic chart by me today.     ROS:   As documented in history of present illness above    Exam:  /70 (BP Location: Right arm, Patient Position: Sitting, BP Cuff Size: Adult)   Pulse 60   Temp 36.4 °C (97.6 °F)   Resp 16   Ht 1.829 m (6')   Wt 120 kg (265 lb)   SpO2 95%   Constitutional: Alert, no distress, plus 3 vital signs  Skin:  Warm, dry, no rashes invisible areas  Eye: Equal, round and reactive, conjunctiva clear  ENMT: Lips without lesions, good dentition, oropharynx clear    Neck: Trachea midline, no masses, no thyromegaly  Respiratory: Unlabored respiration, lungs clear to  auscultation, no wheezes, no rhonchi  Cardiovascular: Normal rate and rhythm, no murmur, no edema  Abdomen: Soft, nontender, no masses or hepatosplenomegaly  Psych: Alert, pleasant, well-groomed, normal affect    Assessment / Plan / Medical Decision makin. Well adult exam     2. Controlled type 2 diabetes mellitus without complication, without long-term current use of insulin (HCC)  HEMOGLOBIN A1C   3. Need for shingles vaccine  Shingles Vaccine (Shingrix)   4. Other fatigue  TESTOSTERONE SERUM   5. Need for pneumococcal vaccination  Pneumococal Polysaccharide Vaccine 23-Valent =>3YO SQ/IM     He has gained a little bit of weight and we discussed decreasing portions, increasing exercise and continuing to limit his carbohydrate intake.  Recommend a recheck of his A1c in just 3 months and then following up here in person in 6 months.  Updated vaccines today to include PCV 23 and Shingrix.  He would like to postpone Tdap and hep B until his 6-month follow-up.  Highly encouraged him to pursue a Covid vaccine as soon as possible but to wait 2 weeks from his shingles vaccine today.  He did request testosterone level and this lab was ordered again for him today, it was not drawn when he went to the lab for his typical lab work.  Reviewed his lab work with him in depth today and answered questions that he had including CBC results.  Discussed that he is not significantly iron deficient but we discussed iron rich food sources.  Fortunately he denies hematochezia, hematuria, hemoptysis.  Pt was counseled regarding all immunizations, what the patient is being immunized against, possible side effects, and the importance of immunization.

## 2021-10-21 DIAGNOSIS — E11.9 CONTROLLED TYPE 2 DIABETES MELLITUS WITHOUT COMPLICATION, WITHOUT LONG-TERM CURRENT USE OF INSULIN (HCC): ICD-10-CM

## 2021-10-21 RX ORDER — METFORMIN HYDROCHLORIDE 500 MG/1
TABLET, EXTENDED RELEASE ORAL
Qty: 90 TABLET | Refills: 3 | Status: SHIPPED | OUTPATIENT
Start: 2021-10-21 | End: 2023-03-07

## 2021-10-21 NOTE — TELEPHONE ENCOUNTER
Received request via: Pharmacy    Was the patient seen in the last year in this department? Yes  LOV 04/05/2021  Does the patient have an active prescription (recently filled or refills available) for medication(s) requested? No

## 2021-11-24 ENCOUNTER — HOSPITAL ENCOUNTER (OUTPATIENT)
Facility: MEDICAL CENTER | Age: 51
End: 2021-11-24
Attending: NURSE PRACTITIONER
Payer: COMMERCIAL

## 2021-11-24 DIAGNOSIS — E11.9 CONTROLLED TYPE 2 DIABETES MELLITUS WITHOUT COMPLICATION, WITHOUT LONG-TERM CURRENT USE OF INSULIN (HCC): ICD-10-CM

## 2021-11-24 DIAGNOSIS — R53.83 OTHER FATIGUE: ICD-10-CM

## 2021-11-24 LAB
EST. AVERAGE GLUCOSE BLD GHB EST-MCNC: 148 MG/DL
HBA1C MFR BLD: 6.8 % (ref 4–5.6)
TESTOST SERPL-MCNC: 395 NG/DL (ref 175–781)

## 2021-11-24 PROCEDURE — 83036 HEMOGLOBIN GLYCOSYLATED A1C: CPT

## 2021-11-24 PROCEDURE — 84403 ASSAY OF TOTAL TESTOSTERONE: CPT

## 2021-11-30 ENCOUNTER — OFFICE VISIT (OUTPATIENT)
Dept: MEDICAL GROUP | Facility: LAB | Age: 51
End: 2021-11-30
Payer: COMMERCIAL

## 2021-11-30 VITALS
DIASTOLIC BLOOD PRESSURE: 82 MMHG | OXYGEN SATURATION: 96 % | BODY MASS INDEX: 35.89 KG/M2 | SYSTOLIC BLOOD PRESSURE: 130 MMHG | WEIGHT: 265 LBS | HEIGHT: 72 IN | TEMPERATURE: 97.7 F | HEART RATE: 62 BPM | RESPIRATION RATE: 16 BRPM

## 2021-11-30 DIAGNOSIS — E11.9 CONTROLLED TYPE 2 DIABETES MELLITUS WITHOUT COMPLICATION, WITHOUT LONG-TERM CURRENT USE OF INSULIN (HCC): ICD-10-CM

## 2021-11-30 DIAGNOSIS — Z23 NEED FOR VACCINATION: ICD-10-CM

## 2021-11-30 DIAGNOSIS — M15.9 PRIMARY OSTEOARTHRITIS INVOLVING MULTIPLE JOINTS: ICD-10-CM

## 2021-11-30 DIAGNOSIS — Z00.00 PREVENTATIVE HEALTH CARE: ICD-10-CM

## 2021-11-30 PROCEDURE — 90471 IMMUNIZATION ADMIN: CPT | Performed by: NURSE PRACTITIONER

## 2021-11-30 PROCEDURE — 90472 IMMUNIZATION ADMIN EACH ADD: CPT | Performed by: NURSE PRACTITIONER

## 2021-11-30 PROCEDURE — 99214 OFFICE O/P EST MOD 30 MIN: CPT | Mod: 25 | Performed by: NURSE PRACTITIONER

## 2021-11-30 PROCEDURE — 90715 TDAP VACCINE 7 YRS/> IM: CPT | Performed by: NURSE PRACTITIONER

## 2021-11-30 PROCEDURE — 90750 HZV VACC RECOMBINANT IM: CPT | Performed by: NURSE PRACTITIONER

## 2021-11-30 RX ORDER — MELOXICAM 15 MG/1
15 TABLET ORAL DAILY
Qty: 30 TABLET | Refills: 5 | Status: SHIPPED | OUTPATIENT
Start: 2021-11-30 | End: 2023-11-14

## 2021-11-30 ASSESSMENT — FIBROSIS 4 INDEX: FIB4 SCORE: 1.62

## 2021-11-30 NOTE — ASSESSMENT & PLAN NOTE
Was off metformin for 2 months before he had labs drawn 11/24/2021 but restarted after seeing labs.    Without metformin, a sensation that eyes were irritated /hair in eye - went away - eye symptoms returned with restarting metformin.  He does not have an eye doc.   He was taking a vitamin from a friend instead of metformin for 2 months and stopped this a week ago.   Denies diarrhea.   Denies foot burning / pain.

## 2021-11-30 NOTE — PROGRESS NOTES
Chief Complaint   Patient presents with   • Diabetes Follow-up       DAWNA Rizzo is a 52 yo est male here to f/u on DM type II:    Controlled type 2 diabetes mellitus without complication, without long-term current use of insulin ([facility])  Most recent A1c returned a few days ago at 6.8, this is a bit more elevated than 6 months ago at 6.4.  Was off metformin for 2 months and instead was taking a vitamin given to him by a friend before he had labs drawn 11/24/2021 but restarted after seeing labs.    Without metformin, a sensation that eyes were irritated /hair in eye - went away - eye symptoms returned with restarting metformin.  He does not have an eye doc.   He was taking a vitamin from a friend instead of metformin for 2 months and stopped this a week ago.   Denies diarrhea.   Denies foot burning / pain.     Osteoarthritis: Suffers from chronic pain in his hands and hip.  If he takes 4 ibuprofen, the pain resolves.  He is taking turmeric but not quite sure if this helps.  No other associated symptoms.      Past medical, surgical, family, and social history is reviewed and updated in Epic chart by me today.   Medications and allergies reviewed and updated in Epic chart by me today.     ROS:   As documented in history of present illness above    Exam:  /82 (BP Location: Left arm, Patient Position: Sitting, BP Cuff Size: Large adult)   Pulse 62   Temp 36.5 °C (97.7 °F)   Resp 16   Ht 1.829 m (6')   Wt 120 kg (265 lb)   SpO2 96%   Constitutional: Alert, no distress, plus 3 vital signs  Skin:  Warm, dry, no rashes invisible areas  Eye: Equal, round and reactive, conjunctiva clear  ENMT: Lips without lesions.  Respiratory: Unlabored respiration.  Cardiovascular: Normal rate.  Psych: Alert, pleasant, well-groomed, normal affect  Monofilament testing with a 10 gram force: sensation intact: intact bilaterally  Visual Inspection: Feet without maceration, ulcers, fissures.  Pedal pulses: intact  bilaterally      Assessment / Plan / Medical Decision making:   Recommend f/u 4/2022 for PE and to review lab work.    shingrix #2 and tdap updated today.  Patient was counseled regarding all immunizations, what the patient is being immunized against, possible side effects, and the importance of immunization.  Encouraged a strict diabetic diet, weight loss and exercise.  Discussed the goal A1c below 6.5 on labs in April.  He plans on following up with / est with an eye doc in the near future.   I also asked him to email me the vitamin he is taking.    Encouraged good foot care.  Trial of meloxicam 15 mg once daily with food.  Discussed GI and renal precautions with meloxicam.  He understands not to mix meloxicam with any other NSAIDs.  He will stop meloxicam after a few weeks if is not helping.    1. Controlled type 2 diabetes mellitus without complication, without long-term current use of insulin (HCC)  HEMOGLOBIN A1C    MICROALBUMIN CREAT RATIO URINE   2. Primary osteoarthritis involving multiple joints  meloxicam (MOBIC) 15 MG tablet   3. Need for vaccination  Shingrix Vaccine    Tdap =>6yo IM   4. Preventative health care  Comp Metabolic Panel    CBC WITH DIFFERENTIAL    Lipid Profile    PROSTATE SPECIFIC AG SCREENING    HEMOGLOBIN A1C    MICROALBUMIN CREAT RATIO URINE

## 2022-03-31 ENCOUNTER — HOSPITAL ENCOUNTER (OUTPATIENT)
Facility: MEDICAL CENTER | Age: 52
End: 2022-03-31
Attending: NURSE PRACTITIONER
Payer: COMMERCIAL

## 2022-03-31 ENCOUNTER — NON-PROVIDER VISIT (OUTPATIENT)
Dept: MEDICAL GROUP | Facility: CLINIC | Age: 52
End: 2022-03-31
Payer: COMMERCIAL

## 2022-03-31 DIAGNOSIS — E11.9 CONTROLLED TYPE 2 DIABETES MELLITUS WITHOUT COMPLICATION, WITHOUT LONG-TERM CURRENT USE OF INSULIN (HCC): ICD-10-CM

## 2022-03-31 DIAGNOSIS — Z00.00 PREVENTATIVE HEALTH CARE: ICD-10-CM

## 2022-03-31 LAB
AMBIGUOUS DTTM AMBI4: NORMAL
AMBIGUOUS DTTM AMBI4: NORMAL

## 2022-03-31 PROCEDURE — 84153 ASSAY OF PSA TOTAL: CPT

## 2022-03-31 PROCEDURE — 82570 ASSAY OF URINE CREATININE: CPT

## 2022-03-31 PROCEDURE — 85025 COMPLETE CBC W/AUTO DIFF WBC: CPT

## 2022-03-31 PROCEDURE — 36415 COLL VENOUS BLD VENIPUNCTURE: CPT | Performed by: PHYSICIAN ASSISTANT

## 2022-03-31 PROCEDURE — 82043 UR ALBUMIN QUANTITATIVE: CPT

## 2022-03-31 PROCEDURE — 80061 LIPID PANEL: CPT

## 2022-03-31 PROCEDURE — 83036 HEMOGLOBIN GLYCOSYLATED A1C: CPT

## 2022-03-31 PROCEDURE — 80053 COMPREHEN METABOLIC PANEL: CPT

## 2022-04-01 LAB
ALBUMIN SERPL BCP-MCNC: 4.6 G/DL (ref 3.2–4.9)
ALBUMIN/GLOB SERPL: 2.1 G/DL
ALP SERPL-CCNC: 59 U/L (ref 30–99)
ALT SERPL-CCNC: 31 U/L (ref 2–50)
ANION GAP SERPL CALC-SCNC: 12 MMOL/L (ref 7–16)
AST SERPL-CCNC: 26 U/L (ref 12–45)
BASOPHILS # BLD AUTO: 0.4 % (ref 0–1.8)
BASOPHILS # BLD: 0.02 K/UL (ref 0–0.12)
BILIRUB SERPL-MCNC: 0.3 MG/DL (ref 0.1–1.5)
BUN SERPL-MCNC: 13 MG/DL (ref 8–22)
CALCIUM SERPL-MCNC: 9.2 MG/DL (ref 8.5–10.5)
CHLORIDE SERPL-SCNC: 104 MMOL/L (ref 96–112)
CHOLEST SERPL-MCNC: 200 MG/DL (ref 100–199)
CO2 SERPL-SCNC: 24 MMOL/L (ref 20–33)
CREAT SERPL-MCNC: 0.93 MG/DL (ref 0.5–1.4)
CREAT UR-MCNC: 268.63 MG/DL
EOSINOPHIL # BLD AUTO: 0.07 K/UL (ref 0–0.51)
EOSINOPHIL NFR BLD: 1.3 % (ref 0–6.9)
ERYTHROCYTE [DISTWIDTH] IN BLOOD BY AUTOMATED COUNT: 43.7 FL (ref 35.9–50)
EST. AVERAGE GLUCOSE BLD GHB EST-MCNC: 151 MG/DL
GFR SERPLBLD CREATININE-BSD FMLA CKD-EPI: 99 ML/MIN/1.73 M 2
GLOBULIN SER CALC-MCNC: 2.2 G/DL (ref 1.9–3.5)
GLUCOSE SERPL-MCNC: 154 MG/DL (ref 65–99)
HBA1C MFR BLD: 6.9 % (ref 4–5.6)
HCT VFR BLD AUTO: 47.2 % (ref 42–52)
HDLC SERPL-MCNC: 45 MG/DL
HGB BLD-MCNC: 15.1 G/DL (ref 14–18)
IMM GRANULOCYTES # BLD AUTO: 0.02 K/UL (ref 0–0.11)
IMM GRANULOCYTES NFR BLD AUTO: 0.4 % (ref 0–0.9)
LDLC SERPL CALC-MCNC: 130 MG/DL
LYMPHOCYTES # BLD AUTO: 1.36 K/UL (ref 1–4.8)
LYMPHOCYTES NFR BLD: 25.9 % (ref 22–41)
MCH RBC QN AUTO: 29.3 PG (ref 27–33)
MCHC RBC AUTO-ENTMCNC: 32 G/DL (ref 33.7–35.3)
MCV RBC AUTO: 91.7 FL (ref 81.4–97.8)
MICROALBUMIN UR-MCNC: <1.2 MG/DL
MICROALBUMIN/CREAT UR: NORMAL MG/G (ref 0–30)
MONOCYTES # BLD AUTO: 0.38 K/UL (ref 0–0.85)
MONOCYTES NFR BLD AUTO: 7.2 % (ref 0–13.4)
NEUTROPHILS # BLD AUTO: 3.4 K/UL (ref 1.82–7.42)
NEUTROPHILS NFR BLD: 64.8 % (ref 44–72)
NRBC # BLD AUTO: 0 K/UL
NRBC BLD-RTO: 0 /100 WBC
PLATELET # BLD AUTO: 171 K/UL (ref 164–446)
PMV BLD AUTO: 12.7 FL (ref 9–12.9)
POTASSIUM SERPL-SCNC: 4.3 MMOL/L (ref 3.6–5.5)
PROT SERPL-MCNC: 6.8 G/DL (ref 6–8.2)
PSA SERPL-MCNC: 0.64 NG/ML (ref 0–4)
RBC # BLD AUTO: 5.15 M/UL (ref 4.7–6.1)
SODIUM SERPL-SCNC: 140 MMOL/L (ref 135–145)
TRIGL SERPL-MCNC: 125 MG/DL (ref 0–149)
WBC # BLD AUTO: 5.3 K/UL (ref 4.8–10.8)

## 2022-05-26 ENCOUNTER — TELEPHONE (OUTPATIENT)
Dept: MEDICAL GROUP | Facility: LAB | Age: 52
End: 2022-05-26
Payer: COMMERCIAL

## 2022-05-26 DIAGNOSIS — M16.9 OSTEOARTHRITIS OF HIP, UNSPECIFIED LATERALITY, UNSPECIFIED OSTEOARTHRITIS TYPE: ICD-10-CM

## 2022-05-26 NOTE — TELEPHONE ENCOUNTER
I reviewed his message to get a diagnoses and saw that he is at the office so I changed it to urgent.  Please let him know that this should be processed within the next 24 hours.  Thanks

## 2023-03-07 DIAGNOSIS — E11.9 CONTROLLED TYPE 2 DIABETES MELLITUS WITHOUT COMPLICATION, WITHOUT LONG-TERM CURRENT USE OF INSULIN (HCC): ICD-10-CM

## 2023-03-07 RX ORDER — METFORMIN HYDROCHLORIDE 500 MG/1
TABLET, EXTENDED RELEASE ORAL
Qty: 90 TABLET | Refills: 3 | Status: SHIPPED | OUTPATIENT
Start: 2023-03-07 | End: 2023-11-14 | Stop reason: SDUPTHER

## 2023-03-07 NOTE — TELEPHONE ENCOUNTER
Received request via: Pharmacy    Was the patient seen in the last year in this department? No  LOV 11/30/2021  Does the patient have an active prescription (recently filled or refills available) for medication(s) requested? No    Does the patient have FDC Plus and need 100 day supply (blood pressure, diabetes and cholesterol meds only)? Patient does not have SCP

## 2023-11-14 ENCOUNTER — HOSPITAL ENCOUNTER (OUTPATIENT)
Facility: MEDICAL CENTER | Age: 53
End: 2023-11-14
Attending: NURSE PRACTITIONER
Payer: COMMERCIAL

## 2023-11-14 ENCOUNTER — TELEPHONE (OUTPATIENT)
Dept: MEDICAL GROUP | Facility: LAB | Age: 53
End: 2023-11-14

## 2023-11-14 ENCOUNTER — OFFICE VISIT (OUTPATIENT)
Dept: MEDICAL GROUP | Facility: LAB | Age: 53
End: 2023-11-14
Payer: COMMERCIAL

## 2023-11-14 VITALS
DIASTOLIC BLOOD PRESSURE: 70 MMHG | SYSTOLIC BLOOD PRESSURE: 120 MMHG | HEIGHT: 72 IN | TEMPERATURE: 97.5 F | RESPIRATION RATE: 12 BRPM | WEIGHT: 270 LBS | HEART RATE: 80 BPM | OXYGEN SATURATION: 94 % | BODY MASS INDEX: 36.57 KG/M2

## 2023-11-14 DIAGNOSIS — E11.9 CONTROLLED TYPE 2 DIABETES MELLITUS WITHOUT COMPLICATION, WITHOUT LONG-TERM CURRENT USE OF INSULIN (HCC): ICD-10-CM

## 2023-11-14 DIAGNOSIS — E55.9 VITAMIN D DEFICIENCY: ICD-10-CM

## 2023-11-14 DIAGNOSIS — E66.9 OBESITY (BMI 30-39.9): ICD-10-CM

## 2023-11-14 DIAGNOSIS — Z00.00 WELL ADULT EXAM: ICD-10-CM

## 2023-11-14 DIAGNOSIS — E34.9 TESTOSTERONE DEFICIENCY: ICD-10-CM

## 2023-11-14 DIAGNOSIS — F17.200 TOBACCO DEPENDENCE: ICD-10-CM

## 2023-11-14 PROBLEM — E29.1 HYPOGONADISM IN MALE: Status: RESOLVED | Noted: 2023-08-24 | Resolved: 2023-11-14

## 2023-11-14 LAB
HBA1C MFR BLD: 7.3 % (ref ?–5.8)
POCT INT CON NEG: NEGATIVE
POCT INT CON POS: POSITIVE

## 2023-11-14 PROCEDURE — 99396 PREV VISIT EST AGE 40-64: CPT | Performed by: NURSE PRACTITIONER

## 2023-11-14 PROCEDURE — 83036 HEMOGLOBIN GLYCOSYLATED A1C: CPT | Performed by: NURSE PRACTITIONER

## 2023-11-14 PROCEDURE — 82570 ASSAY OF URINE CREATININE: CPT

## 2023-11-14 PROCEDURE — 3078F DIAST BP <80 MM HG: CPT | Performed by: NURSE PRACTITIONER

## 2023-11-14 PROCEDURE — 3074F SYST BP LT 130 MM HG: CPT | Performed by: NURSE PRACTITIONER

## 2023-11-14 PROCEDURE — 82043 UR ALBUMIN QUANTITATIVE: CPT

## 2023-11-14 RX ORDER — SEMAGLUTIDE 0.68 MG/ML
0.25 INJECTION, SOLUTION SUBCUTANEOUS
Qty: 3 ML | Refills: 11 | Status: SHIPPED | OUTPATIENT
Start: 2023-11-14 | End: 2024-03-19

## 2023-11-14 RX ORDER — METFORMIN HYDROCHLORIDE 500 MG/1
500 TABLET, EXTENDED RELEASE ORAL
Qty: 90 TABLET | Refills: 3 | Status: SHIPPED | OUTPATIENT
Start: 2023-11-14

## 2023-11-14 ASSESSMENT — FIBROSIS 4 INDEX: FIB4 SCORE: 1.45

## 2023-11-14 ASSESSMENT — PATIENT HEALTH QUESTIONNAIRE - PHQ9: CLINICAL INTERPRETATION OF PHQ2 SCORE: 0

## 2023-11-14 NOTE — PROGRESS NOTES
Subjective:     CC:   Chief Complaint   Patient presents with    Diabetes Follow-up       HPI:   Matthias is a 53 y.o. male who presents for annual exam.  Pmhx of OA / hypogonadism / DM type II / obesity.  [facility] and this has dramatically helped with his energy level as well as lowered or eliminated his osteoarthritis pain in his hips.  He does have labs done through [facility] [place] about every 3 to 4 months, he is uncertain where these are sent as they are drawn in office.  Very physically active around 5 acres at home.   Bowels moving well.   Denies bladder issues.  Denies headaches, chest pain or trouble breathing.  Continues to use chewing tobacco.  Has cut alcohol down to 2 beers per week.    He  has a past medical history of Diabetes ([facility]) (06/2020), Hypogonadism in male (08/24/2023), LLQ abdominal pain (03/08/2014), Renal disorder (2006), and Tobacco dependence (01/14/2016).    He has no past medical history of COPD or Hyperlipidemia.  He  has a past surgical history that includes appendectomy (1988); hip arthroscopy (Left, 6/12/2020); acetabular osteoplasty (Left, 6/12/2020); and femoral neck osteoplasty (Left, 6/12/2020).    Family History   Problem Relation Age of Onset    Hypertension Father     Diabetes Father     Lung Disease Neg Hx     Cancer Neg Hx     Heart Disease Neg Hx     Stroke Neg Hx      Social History     Tobacco Use    Smoking status: Never    Smokeless tobacco: Current     Types: Chew    Tobacco comments:     one can a day    Vaping Use    Vaping Use: Never used   Substance Use Topics    Alcohol use: Yes     Alcohol/week: 0.0 oz     Comment: 9-10 per week    Drug use: No     He  reports being sexually active and has had partner(s) who are female.    Patient Active Problem List    Diagnosis Date Noted    Chronic bilateral low back pain without sciatica 10/14/2019    Testosterone deficiency - level 395 off injections 11/2021.   07/11/2019    Controlled type 2 diabetes mellitus without complication, without long-term current use of insulin (HCC) 06/28/2018    Chronic left hip pain 04/06/2018    Snoring 04/06/2018    Obesity (BMI 30-39.9) 04/06/2018    Night sweats 04/06/2018    Tobacco dependence 01/14/2016    Vitamin D deficiency 03/22/2014     Current Outpatient Medications   Medication Sig Dispense Refill    metFORMIN ER (GLUCOPHAGE XR) 500 MG TABLET SR 24 HR TAKE ONE TABLET BY MOUTH DAILY WITH DINNER 90 Tablet 3    meloxicam (MOBIC) 15 MG tablet Take 1 Tablet by mouth every day. With food and water for arthritis.  Stop if you begin to have upper abdominal pain. 30 Tablet 5     No current facility-administered medications for this visit.     No Known Allergies    Review of Systems   Constitutional: Negative for fever, chills and malaise/fatigue.   HENT: Negative for congestion.    Eyes: Negative for pain.   Respiratory: Negative for cough and shortness of breath.    Cardiovascular: Negative for chest pain and leg swelling.   Gastrointestinal: Negative for nausea, vomiting, abdominal pain and diarrhea.   Genitourinary: Negative for dysuria and hematuria.   Skin: Negative for rash.   Neurological: Negative for dizziness, focal weakness and headaches.   Endo/Heme/Allergies: Does not bruise/bleed easily.   Psychiatric/Behavioral: Negative for depression.  The patient is not nervous/anxious.      Objective:   /70 (BP Location: Left arm, Patient Position: Sitting, BP Cuff Size: Large adult)   Pulse 80   Temp 36.4 °C (97.5 °F)   Resp 12   Ht 1.829 m (6')   Wt 122 kg (270 lb)   SpO2 94%       Physical Exam:  Constitutional: Overweight male in no distress.  Not diaphoretic. No distress.   Skin: Skin is warm and dry. No rash noted.  Head: Atraumatic without lesions.  Eyes: Conjunctivae and extraocular motions are normal. Pupils are equal, round, and reactive to light. No scleral icterus.   Ears:  External ears unremarkable. Tympanic  "membranes clear and intact.  Nose: Nares patent. Septum midline. Turbinates without erythema nor edema. No discharge.   Mouth/Throat: Tongue normal. Oropharynx is clear and moist. Posterior pharynx without erythema or exudates.  Neck: Supple, trachea midline. Normal range of motion. No thyromegaly present. No lymphadenopathy--cervical or supraclavicular.  Cardiovascular: Regular rate and rhythm, S1 and S2 without murmur, rubs, or gallops.    Respiratory: Effort normal. Clear to auscultation throughout. No adventitious sounds.   Abdomen: Soft, non tender, and without distention. Active bowel sounds in all four quadrants. No rebound, guarding, masses or HSM.  Extremities: No cyanosis, clubbing, erythema, nor edema. Distal pulses intact and symmetric.   Musculoskeletal: All major joints AROM full in all directions without pain.  Neurological: Alert and oriented x 3. Grossly non-focal. Strength and sensation grossly intact. DTRs 2+/3 and symmetric.   Psychiatric:  Behavior, mood, and affect are appropriate.  Monofilament testing with a 10 gram force: sensation intact: intact bilaterally  Visual Inspection: Feet without maceration, ulcers, fissures.  Pedal pulses: intact bilaterally       Assessment and Plan:   \"  1. Well adult exam        2. Controlled type 2 diabetes mellitus without complication, without long-term current use of insulin ([facility])  POCT Hemoglobin A1C    MICROALBUMIN CREAT RATIO URINE    Diabetic Monofilament LE Exam    metFORMIN ER (GLUCOPHAGE XR) 500 MG TABLET SR 24 HR      3. Vitamin D deficiency        4. Tobacco dependence        5. Testosterone deficiency - [facility] [place] pellets        6. Obesity (BMI 30-39.9)        A1c today in the office is at 7.3.  Continue metformin 500 mg XR once daily.  Add on Ozempic 0.25 mg weekly, increasing to 0.5 mg after 2 weeks if tolerated well.  Discussed how to utilize a GLP-1 agonist along with potential side effects, benefits and the need for follow-up within 3 months " for A1c repeat.  We discussed early satiety and working on smaller portions when using medication such as Ozempic.  Discussed safety of metformin.  Requested labs from [facility] as he has had a multitude of tests done through [facility] [place] in the past 12 months.  Collected microalbumin here in the office.  Monofilament exam done.  Encouraged him to stop utilizing chewing tobacco.  Discussed that Ozempic will help him lose weight.  Colonoscopy is up-to-date.  Refuses all vaccines.  Encouraged continued exercise.  Anticipatory guidance:  Encouraged daily physical exercise, high fiber / vegetable based diet, 8 hours of sleep at night, skin protection from sun with suncreen / clothing, yearly derm consults.

## 2023-11-14 NOTE — TELEPHONE ENCOUNTER
----- Message from ERMA Porter sent at 11/14/2023  7:00 AM PST -----  Please contact [facility] for last labs - drawn at [facility] [place] and possibly sent to [facility].  Done 2 months ago and every few months prior that if we can grab a year of labs. thanks

## 2023-11-14 NOTE — LETTER
[facility]  Leonor Jaffe A.P.N.  [address]  [place] [address]  Fax: [phone]   Authorization for Release/Disclosure of   Protected Health Information   Name: MAINE GILLESPIE : 1970 SSN: [id number]   Address: [address] [place] [id number] Phone:    [phone] (home)    I authorize the entity listed below to release/disclose the PHI below to:   [facility]/Leonor Jaffe, A.P.N. and Leonor Jaffe A.P.N.   Provider or Entity Name:  [facility]   Address   City, State, Zip   Phone:      Fax:     Reason for request: continuity of care   Information to be released:    [  ] LAST COLONOSCOPY,  including any PATH REPORT and follow-up  [  ] LAST FIT/COLOGUARD RESULT [  ] LAST DEXA  [  ] LAST MAMMOGRAM  [  ] LAST PAP  [ XX ] ALL LABS WITHIN THE LAST YEAR [  ] RETINA EXAM REPORT  [  ] IMMUNIZATION RECORDS  [  ] Release all info      [  ] Check here and initial the line next to each item to release ALL health information INCLUDING  _____ Care and treatment for drug and / or alcohol abuse  _____ HIV testing, infection status, or AIDS  _____ Genetic Testing    DATES OF SERVICE OR TIME PERIOD TO BE DISCLOSED: _____________  I understand and acknowledge that:  * This Authorization may be revoked at any time by you in writing, except if your health information has already been used or disclosed.  * Your health information that will be used or disclosed as a result of you signing this authorization could be re-disclosed by the recipient. If this occurs, your re-disclosed health information may no longer be protected by State or Federal laws.  * You may refuse to sign this Authorization. Your refusal will not affect your ability to obtain treatment.  * This Authorization becomes effective upon signing and will  on (date) __________.      If no date is indicated, this Authorization will  one (1) year from the signature date.    Name: [place]  Johnny Medina  Signature: CONTINUITY OF CARE Date:   11/14/2023     PLEASE FAX REQUESTED RECORDS BACK TO: [phone]

## 2023-11-15 LAB
CREAT UR-MCNC: 267.93 MG/DL
MICROALBUMIN UR-MCNC: <1.2 MG/DL
MICROALBUMIN/CREAT UR: NORMAL MG/G (ref 0–30)

## 2024-02-27 ENCOUNTER — APPOINTMENT (OUTPATIENT)
Dept: MEDICAL GROUP | Facility: LAB | Age: 54
End: 2024-02-27
Payer: COMMERCIAL

## 2024-03-19 ENCOUNTER — OFFICE VISIT (OUTPATIENT)
Dept: MEDICAL GROUP | Facility: LAB | Age: 54
End: 2024-03-19
Payer: COMMERCIAL

## 2024-03-19 VITALS
OXYGEN SATURATION: 95 % | SYSTOLIC BLOOD PRESSURE: 126 MMHG | TEMPERATURE: 97.3 F | HEIGHT: 72 IN | HEART RATE: 66 BPM | DIASTOLIC BLOOD PRESSURE: 70 MMHG | BODY MASS INDEX: 34.4 KG/M2 | RESPIRATION RATE: 16 BRPM | WEIGHT: 254 LBS

## 2024-03-19 DIAGNOSIS — Z00.00 PREVENTATIVE HEALTH CARE: ICD-10-CM

## 2024-03-19 DIAGNOSIS — E11.9 CONTROLLED TYPE 2 DIABETES MELLITUS WITHOUT COMPLICATION, WITHOUT LONG-TERM CURRENT USE OF INSULIN (HCC): ICD-10-CM

## 2024-03-19 DIAGNOSIS — E66.9 OBESITY (BMI 30-39.9): ICD-10-CM

## 2024-03-19 LAB
HBA1C MFR BLD: 6.4 % (ref ?–5.8)
POCT INT CON NEG: NEGATIVE
POCT INT CON POS: POSITIVE

## 2024-03-19 PROCEDURE — 99214 OFFICE O/P EST MOD 30 MIN: CPT | Performed by: NURSE PRACTITIONER

## 2024-03-19 PROCEDURE — 3074F SYST BP LT 130 MM HG: CPT | Performed by: NURSE PRACTITIONER

## 2024-03-19 PROCEDURE — 3078F DIAST BP <80 MM HG: CPT | Performed by: NURSE PRACTITIONER

## 2024-03-19 PROCEDURE — 83036 HEMOGLOBIN GLYCOSYLATED A1C: CPT | Performed by: NURSE PRACTITIONER

## 2024-03-19 ASSESSMENT — PATIENT HEALTH QUESTIONNAIRE - PHQ9: CLINICAL INTERPRETATION OF PHQ2 SCORE: 0

## 2024-03-19 ASSESSMENT — FIBROSIS 4 INDEX: FIB4 SCORE: 1.47

## 2024-03-19 NOTE — PROGRESS NOTES
Verbal consent was acquired by the patient to use [employer] ambient listening note generation during this visit Yes      Subjective   Harlan Medina is a 54 y.o. male who presents for f/u on DM.    History of Present Illness  The patient is a 54-year-old established male here to follow up on type 2 diabetes and obesity, currently treated with metformin 500 mg once daily with dinner and Ozempic 0.5 mg weekly.  Last A1c was 11/2023 at 7.3 with a range of 5.9 to 7.3 over the past 5 years.  Energy has improved!    He started Ozempic 11/2023 and has lost 20 pounds. He is injecting 0.5 mg. His goal weight is 239 pounds. He does not feel nauseated or too full on a regular basis. If he makes himself eat too much, he does not feel very good. He denies any constipation or diarrhea. He denies any trouble with his feet. He denies any burns, blisters, or wounds on his feet. He denies any pain with urination. He does not get up more than twice at night to urinate. He denies any blood in his urine. He denies any big changes in his vision in the past year. He went to the eye doctor within the year who said everything was fine. He denies any chest pain or trouble breathing. He sleeps well at night.    Supplemental Information  He has not had any surgery since his hip surgery 2020. His hip is feeling better. He is not limping. He goes to the dentist once a year.   He is still chewing tobacco. He has never been a smoker.   His father is diabetic and is on dialysis.    Review of Systems  Neg HPI    Objective   /70 (BP Location: Left arm, Patient Position: Sitting, BP Cuff Size: Large adult)   Pulse 66   Temp 36.3 °C (97.3 °F)   Resp 16   Ht 1.829 m (6')   Wt 115 kg (254 lb)   SpO2 95%   Physical Exam  Gen: NAD  Resp: nonlabored.  Able to speak in full sentences.  CTA bilaterally.  CV RRR  Psy: pleasant / cooperative.   Neuro:  Alert and oriented x 3      Assessment & Plan  1. Type 2 diabetes.  Excellent control.   His A1c is 6.4 today. I will increase his Ozempic to 1 mg weekly. In a couple of months, we can go to 2 mg if tolerated.  Recommend a full updated lab panel.  I will do his A1c in 6 months in the office. He will let me know if he has any trouble filling the Ozempic.  We briefly discussed Mounjaro if he hits a weight plateau with Ozempic.  Continue metformin.  Continue eye appointments yearly.  Encouraged good footcare.  Discussed a diabetic diet and exercise.  2.  Obesity: Improving.  Continue Ozempic and metformin.  Follow-up here 6 months.    Follow-up  The patient will follow up in 09/2024.               Please note that this dictation was created using voice recognition software. I have made every reasonable attempt to correct obvious errors, but I expect that there are errors of grammar and possibly content that I did not discover before finalizing the note.

## 2024-06-07 RX ORDER — SEMAGLUTIDE 1.34 MG/ML
1 INJECTION, SOLUTION SUBCUTANEOUS
Qty: 3 ML | Refills: 2 | Status: SHIPPED | OUTPATIENT
Start: 2024-06-07 | End: 2024-06-17

## 2024-06-07 NOTE — TELEPHONE ENCOUNTER
Received request via: Pharmacy    Was the patient seen in the last year in this department? Yes3/19/24    Does the patient have an active prescription (recently filled or refills available) for medication(s) requested? No    Pharmacy Name: [facility]    Does the patient have snf Plus and need 100 day supply (blood pressure, diabetes and cholesterol meds only)? Medication is not for cholesterol, blood pressure or diabetes

## 2024-06-13 ENCOUNTER — PATIENT MESSAGE (OUTPATIENT)
Dept: MEDICAL GROUP | Facility: LAB | Age: 54
End: 2024-06-13
Payer: COMMERCIAL

## 2024-06-17 RX ORDER — TIRZEPATIDE 5 MG/.5ML
5 INJECTION, SOLUTION SUBCUTANEOUS
Qty: 2 ML | Refills: 2 | Status: SHIPPED | OUTPATIENT
Start: 2024-06-17

## 2024-06-19 ENCOUNTER — TELEPHONE (OUTPATIENT)
Dept: MEDICAL GROUP | Facility: LAB | Age: 54
End: 2024-06-19
Payer: COMMERCIAL

## 2024-07-16 RX ORDER — SEMAGLUTIDE 1.34 MG/ML
1 INJECTION, SOLUTION SUBCUTANEOUS
Qty: 3 ML | Refills: 2 | Status: SHIPPED | OUTPATIENT
Start: 2024-07-16

## 2024-08-16 DIAGNOSIS — E11.9 CONTROLLED TYPE 2 DIABETES MELLITUS WITHOUT COMPLICATION, WITHOUT LONG-TERM CURRENT USE OF INSULIN (HCC): ICD-10-CM

## 2024-08-19 RX ORDER — TIRZEPATIDE 5 MG/.5ML
5 INJECTION, SOLUTION SUBCUTANEOUS
Qty: 2 ML | Refills: 2 | Status: SHIPPED | OUTPATIENT
Start: 2024-08-19

## 2024-08-19 RX ORDER — SEMAGLUTIDE 1.34 MG/ML
1 INJECTION, SOLUTION SUBCUTANEOUS
Qty: 3 ML | Refills: 2 | Status: SHIPPED | OUTPATIENT
Start: 2024-08-19

## 2024-08-19 RX ORDER — METFORMIN HCL 500 MG
500 TABLET, EXTENDED RELEASE 24 HR ORAL
Qty: 90 TABLET | Refills: 3 | Status: SHIPPED | OUTPATIENT
Start: 2024-08-19

## 2024-08-20 ENCOUNTER — TELEPHONE (OUTPATIENT)
Dept: MEDICAL GROUP | Facility: LAB | Age: 54
End: 2024-08-20
Payer: COMMERCIAL

## 2024-08-20 NOTE — TELEPHONE ENCOUNTER
Information regarding your request  The member benefit does not include pharmacy drug coverage. Eligible drugs may be covered under the medical benefit.

## 2024-08-20 NOTE — TELEPHONE ENCOUNTER
Matthias Medina    Date of birth    1970    Medication    Mounjaro 5MG/0.5ML pen-injectors    Rejection code    79 To override Refill Too Soon reject for non-opioids, please re-submit with submission clarification code 7. Refill Too Soon;Next available fill date is 09/12/2024. Patient has 28 days supply on hand. Must utilize 24 days supply before obtaining another fill.

## 2024-08-27 ENCOUNTER — TELEPHONE (OUTPATIENT)
Dept: MEDICAL GROUP | Facility: LAB | Age: 54
End: 2024-08-27
Payer: COMMERCIAL

## 2024-08-27 ENCOUNTER — PATIENT MESSAGE (OUTPATIENT)
Dept: MEDICAL GROUP | Facility: LAB | Age: 54
End: 2024-08-27
Payer: COMMERCIAL

## 2024-08-27 NOTE — TELEPHONE ENCOUNTER
MEHREEN Medina JR (Key: [id number])  PA Case ID #: [id number]    Approved today by [facility]  Request Reference Number: [id number]. MOUNJARO INJ 5MG/0.5 is approved through 08/27/2025. Your patient may now fill this prescription and it will be covered.  Authorization Expiration Date: 8/26/2025

## 2024-08-27 NOTE — TELEPHONE ENCOUNTER
MEHREEN Medina JR (Key: [id number])  PA Case ID #: [id number]  Need Help? Call us at [phone]    Drug  Mounjaro 5MG/0.5ML pen-injectors   Bleeding that does not stop/Swelling that gets worse/Pain not relieved by Medications/Fever greater than (need to indicate Fahrenheit or Celsius)

## 2024-09-02 ENCOUNTER — PATIENT MESSAGE (OUTPATIENT)
Dept: MEDICAL GROUP | Facility: LAB | Age: 54
End: 2024-09-02
Payer: COMMERCIAL

## 2024-09-02 RX ORDER — SEMAGLUTIDE 2.68 MG/ML
2 INJECTION, SOLUTION SUBCUTANEOUS
Qty: 2 ML | Refills: 2 | Status: SHIPPED | OUTPATIENT
Start: 2024-09-02 | End: 2024-09-03

## 2024-09-03 RX ORDER — TIRZEPATIDE 5 MG/.5ML
5 INJECTION, SOLUTION SUBCUTANEOUS
Qty: 2 ML | Refills: 2 | Status: SHIPPED | OUTPATIENT
Start: 2024-09-03 | End: 2024-09-26

## 2024-09-24 ENCOUNTER — PATIENT MESSAGE (OUTPATIENT)
Dept: MEDICAL GROUP | Facility: LAB | Age: 54
End: 2024-09-24
Payer: COMMERCIAL

## 2024-09-24 DIAGNOSIS — E11.9 CONTROLLED TYPE 2 DIABETES MELLITUS WITHOUT COMPLICATION, WITHOUT LONG-TERM CURRENT USE OF INSULIN (HCC): ICD-10-CM

## 2024-09-24 NOTE — TELEPHONE ENCOUNTER
Yes, thanks.  A1c added.  Please let him know that he needs to notify the lab to draw labs ordered today and in March.  Thanks

## 2024-09-25 ENCOUNTER — HOSPITAL ENCOUNTER (OUTPATIENT)
Facility: MEDICAL CENTER | Age: 54
End: 2024-09-25
Attending: NURSE PRACTITIONER
Payer: COMMERCIAL

## 2024-09-25 ENCOUNTER — NON-PROVIDER VISIT (OUTPATIENT)
Dept: MEDICAL GROUP | Facility: CLINIC | Age: 54
End: 2024-09-25
Payer: COMMERCIAL

## 2024-09-25 DIAGNOSIS — Z00.00 PREVENTATIVE HEALTH CARE: ICD-10-CM

## 2024-09-25 DIAGNOSIS — E11.9 CONTROLLED TYPE 2 DIABETES MELLITUS WITHOUT COMPLICATION, WITHOUT LONG-TERM CURRENT USE OF INSULIN (HCC): ICD-10-CM

## 2024-09-25 LAB
ALBUMIN SERPL BCP-MCNC: 4.1 G/DL (ref 3.2–4.9)
ALBUMIN/GLOB SERPL: 1.5 G/DL
ALP SERPL-CCNC: 38 U/L (ref 30–99)
ALT SERPL-CCNC: 16 U/L (ref 2–50)
ANION GAP SERPL CALC-SCNC: 12 MMOL/L (ref 7–16)
AST SERPL-CCNC: 15 U/L (ref 12–45)
BASOPHILS # BLD AUTO: 0.4 % (ref 0–1.8)
BASOPHILS # BLD: 0.03 K/UL (ref 0–0.12)
BILIRUB SERPL-MCNC: 0.7 MG/DL (ref 0.1–1.5)
BUN SERPL-MCNC: 10 MG/DL (ref 8–22)
CALCIUM ALBUM COR SERPL-MCNC: 9.1 MG/DL (ref 8.5–10.5)
CALCIUM SERPL-MCNC: 9.2 MG/DL (ref 8.5–10.5)
CHLORIDE SERPL-SCNC: 102 MMOL/L (ref 96–112)
CHOLEST SERPL-MCNC: 167 MG/DL (ref 100–199)
CO2 SERPL-SCNC: 23 MMOL/L (ref 20–33)
CREAT SERPL-MCNC: 0.99 MG/DL (ref 0.5–1.4)
EOSINOPHIL # BLD AUTO: 0.1 K/UL (ref 0–0.51)
EOSINOPHIL NFR BLD: 1.4 % (ref 0–6.9)
ERYTHROCYTE [DISTWIDTH] IN BLOOD BY AUTOMATED COUNT: 45.6 FL (ref 35.9–50)
GFR SERPLBLD CREATININE-BSD FMLA CKD-EPI: 90 ML/MIN/1.73 M 2
GLOBULIN SER CALC-MCNC: 2.7 G/DL (ref 1.9–3.5)
GLUCOSE SERPL-MCNC: 130 MG/DL (ref 65–99)
HCT VFR BLD AUTO: 56.6 % (ref 42–52)
HDLC SERPL-MCNC: 37 MG/DL
HGB BLD-MCNC: 18.7 G/DL (ref 14–18)
IMM GRANULOCYTES # BLD AUTO: 0.04 K/UL (ref 0–0.11)
IMM GRANULOCYTES NFR BLD AUTO: 0.6 % (ref 0–0.9)
LDLC SERPL CALC-MCNC: 99 MG/DL
LYMPHOCYTES # BLD AUTO: 1.26 K/UL (ref 1–4.8)
LYMPHOCYTES NFR BLD: 18.1 % (ref 22–41)
MCH RBC QN AUTO: 29.9 PG (ref 27–33)
MCHC RBC AUTO-ENTMCNC: 33 G/DL (ref 32.3–36.5)
MCV RBC AUTO: 90.6 FL (ref 81.4–97.8)
MONOCYTES # BLD AUTO: 0.48 K/UL (ref 0–0.85)
MONOCYTES NFR BLD AUTO: 6.9 % (ref 0–13.4)
NEUTROPHILS # BLD AUTO: 5.06 K/UL (ref 1.82–7.42)
NEUTROPHILS NFR BLD: 72.6 % (ref 44–72)
NRBC # BLD AUTO: 0 K/UL
NRBC BLD-RTO: 0 /100 WBC (ref 0–0.2)
PLATELET # BLD AUTO: 158 K/UL (ref 164–446)
PMV BLD AUTO: 12.7 FL (ref 9–12.9)
POTASSIUM SERPL-SCNC: 4.2 MMOL/L (ref 3.6–5.5)
PROT SERPL-MCNC: 6.8 G/DL (ref 6–8.2)
PSA SERPL-MCNC: 1.17 NG/ML (ref 0–4)
RBC # BLD AUTO: 6.25 M/UL (ref 4.7–6.1)
SODIUM SERPL-SCNC: 137 MMOL/L (ref 135–145)
TRIGL SERPL-MCNC: 155 MG/DL (ref 0–149)
WBC # BLD AUTO: 7 K/UL (ref 4.8–10.8)

## 2024-09-25 PROCEDURE — 80061 LIPID PANEL: CPT

## 2024-09-25 PROCEDURE — 82043 UR ALBUMIN QUANTITATIVE: CPT

## 2024-09-25 PROCEDURE — 85025 COMPLETE CBC W/AUTO DIFF WBC: CPT

## 2024-09-25 PROCEDURE — 84153 ASSAY OF PSA TOTAL: CPT

## 2024-09-25 PROCEDURE — 82570 ASSAY OF URINE CREATININE: CPT

## 2024-09-25 PROCEDURE — 83036 HEMOGLOBIN GLYCOSYLATED A1C: CPT

## 2024-09-25 PROCEDURE — 80053 COMPREHEN METABOLIC PANEL: CPT

## 2024-09-25 NOTE — PROGRESS NOTES
Harlan Medina is a 54 y.o. male here for a non-provider visit for a lab draw on 9/25/2024 at 7:48 AM.    Procedure performed:  Venipuncture     Anatomical site:  Left Antecubital Area    Equipment used:  21 g butterfly     Labs drawn:  A1c, Comp Metabolic Panel , CBC with differential , Lipid Profile, PSA, and Microalbumin    Ordering provider:  Leonor FAULKNER    Lab draw completed by:  Roseline Hicks MA

## 2024-09-26 ENCOUNTER — OFFICE VISIT (OUTPATIENT)
Dept: MEDICAL GROUP | Facility: LAB | Age: 54
End: 2024-09-26
Payer: COMMERCIAL

## 2024-09-26 VITALS
OXYGEN SATURATION: 95 % | TEMPERATURE: 98.1 F | DIASTOLIC BLOOD PRESSURE: 68 MMHG | HEIGHT: 72 IN | SYSTOLIC BLOOD PRESSURE: 120 MMHG | BODY MASS INDEX: 33.46 KG/M2 | RESPIRATION RATE: 12 BRPM | WEIGHT: 247 LBS | HEART RATE: 78 BPM

## 2024-09-26 DIAGNOSIS — E11.9 CONTROLLED TYPE 2 DIABETES MELLITUS WITHOUT COMPLICATION, WITHOUT LONG-TERM CURRENT USE OF INSULIN (HCC): ICD-10-CM

## 2024-09-26 DIAGNOSIS — E34.9 TESTOSTERONE DEFICIENCY: ICD-10-CM

## 2024-09-26 DIAGNOSIS — E66.9 OBESITY (BMI 30-39.9): ICD-10-CM

## 2024-09-26 DIAGNOSIS — D75.1 POLYCYTHEMIA: ICD-10-CM

## 2024-09-26 LAB
CREAT UR-MCNC: 189 MG/DL
EST. AVERAGE GLUCOSE BLD GHB EST-MCNC: 134 MG/DL
HBA1C MFR BLD: 6.3 % (ref 4–5.6)
MICROALBUMIN UR-MCNC: <1.2 MG/DL
MICROALBUMIN/CREAT UR: NORMAL MG/G (ref 0–30)

## 2024-09-26 PROCEDURE — 99214 OFFICE O/P EST MOD 30 MIN: CPT | Performed by: NURSE PRACTITIONER

## 2024-09-26 PROCEDURE — 3074F SYST BP LT 130 MM HG: CPT | Performed by: NURSE PRACTITIONER

## 2024-09-26 PROCEDURE — 3078F DIAST BP <80 MM HG: CPT | Performed by: NURSE PRACTITIONER

## 2024-09-26 RX ORDER — TIRZEPATIDE 7.5 MG/.5ML
7.5 INJECTION, SOLUTION SUBCUTANEOUS
Qty: 2 ML | Refills: 2 | Status: SHIPPED | OUTPATIENT
Start: 2024-09-26

## 2024-09-26 RX ORDER — TIRZEPATIDE 7.5 MG/.5ML
7.5 INJECTION, SOLUTION SUBCUTANEOUS
Qty: 2 ML | Refills: 2 | Status: SHIPPED | OUTPATIENT
Start: 2024-09-26 | End: 2024-09-26 | Stop reason: SDUPTHER

## 2024-09-26 ASSESSMENT — FIBROSIS 4 INDEX: FIB4 SCORE: 1.28

## 2024-09-26 NOTE — PROGRESS NOTES
Verbal consent was acquired by the patient to use [employer] ambient listening note generation during this visit Yes      Subjective   Harlan Medina is a 54 y.o. male who presents for f/u  History of Present Illness  The patient is a 54-year-old established male here to follow up on type 2 diabetes, obesity, lab work and dyslipidemia    He has been doing well on a GLP-1 agonist and has lost weight as well as significantly reduced his A1c. His current medication regimen includes Mounjaro 5 mg weekly and metformin daily. He has received two injections of Mounjaro and has two remaining -it took his pharmacy a few weeks to get the Mounjaro in stock.  He was previously on Ozempic and was not losing the weight that he wanted. He does not take any cholesterol-lowering medications or baby aspirin. He reports feeling well overall and has noticed an improvement in his vision.  Denies any foot numbness or tingling.  No recent wounds.  Denies problems with bowels or bladder.    He reports regular bowel movements and no issues with urination. He sleeps soundly at night, waking only once to use the bathroom. He experiences no abdominal discomfort. He takes a chewable vitamin supplement that helps with his bowels. He has not undergone any surgeries in the past few months. He is due for a colonoscopy, having had one approximately 5 or 6 years ago during which a polyp was found.    He has testosterone pellets from [facility] but has never donated blood. His weight goal is 230 pounds.    FAMILY HISTORY  His father is on dialysis.    Review of Systems  Negative except for HPI  Objective   /68 (BP Location: Left arm, Patient Position: Sitting, BP Cuff Size: Large adult)   Pulse 78   Temp 36.7 °C (98.1 °F)   Resp 12   Ht 1.829 m (6')   Wt 112 kg (247 lb)   SpO2 95%   Physical Exam  Gen. appears healthy in no distress   Skin appropriate for sex and age   Neck trachea is midline  Lungs unlabored breathing  Heart  regular rate  Neuro gait and station normal   Psych appropriate, calm, interactive, well-groomed    Results  Laboratory Studies  A1c is down to 6.3. Average blood sugar is 130. Urine sample negative for microalbuminuria.  PSA within normal limits.  LDL 99.!  Triglycerides are down by 120 points.       Assessment & Plan  1. Type 2 Diabetes Mellitus.  His A1c has improved from 7.3 in November 2023 to 6.3 currently!  He will continue on metformin once daily and I increased his Mounjaro to 7.5 mg for next refill, he may complete the next 2 weeks with 5 mg.  Discussed treatment of constipation if this begins to occur.  Encouraged continued efforts at exercise and a diabetic diet.  Recheck A1c here in the office in 6 months.  Encouraged him to see his eye doctor yearly.  Discussed good footcare.     2. Obesity.  He has lost weight, currently at 247 lbs, down from 270 lbs in November 2023. He was advised to continue his current regimen and aim for a weight goal of 230 lbs. The increase in Mounjaro dose to 7.5 mg weekly is expected to aid in further weight loss.    3.  Family of history of hypertension:      4. Hyperlipidemia.  His cholesterol levels are satisfactory, with a decrease in LDL by 31 points and triglycerides by 120 points. He was advised to consume foods rich in omega-3 fatty acids such as berries, nuts, beans, salmon, eggs, and avocado to raise his HDL levels.    5. Polycythemia.  His red blood cell count is elevated, increasing his risk for stroke or thromboembolism. He was advised to donate blood every 3 months while on testosterone therapy. Until he donates blood, he should take baby aspirin 81 mg daily.     6. Finger Pain.  He experiences pain in his fingers, likely due to arthritis and tendinitis from overuse. He was advised to stretch his tendons during golfing and apply a topical analgesic. Ibuprofen taken with food and water before golfing may help alleviate the pain. If ibuprofen does not help,  prescription strength arthritis medication may be considered. If the pain worsens and does not respond to topical treatments, x-rays may be ordered to assess the degree of arthritis. Referral to a hand surgeon for potential cortisone injections may be considered if the pain persists.    7. Health Maintenance.  He was advised to contact [facility] for a colonoscopy, as he is due for one.    Follow-up  Return in 6 months for A1c check and follow-up.               Please note that this dictation was created using voice recognition software. I have made every reasonable attempt to correct obvious errors, but I expect that there are errors of grammar and possibly content that I did not discover before finalizing the note.

## 2024-09-26 NOTE — PATIENT INSTRUCTIONS
Call [facility] and schedule your colonoscopy.     Take 81 mg asa with food once daily until you donate blood.  Donate blood every 3 months if possible.      You are doing excellent!

## 2024-12-20 RX ORDER — TIRZEPATIDE 7.5 MG/.5ML
INJECTION, SOLUTION SUBCUTANEOUS
Qty: 2 ML | Refills: 0 | Status: SHIPPED | OUTPATIENT
Start: 2024-12-20

## 2024-12-20 NOTE — TELEPHONE ENCOUNTER
Received request via: Pharmacy    Was the patient seen in the last year in this department? Yes  9/26/24  Does the patient have an active prescription (recently filled or refills available) for medication(s) requested? No    Pharmacy Name: [facility]    Does the patient have [facility] Plus and need 100-day supply? (This applies to ALL medications) Patient does not have SCP

## 2025-01-27 RX ORDER — TIRZEPATIDE 7.5 MG/.5ML
INJECTION, SOLUTION SUBCUTANEOUS
Qty: 2 ML | Refills: 2 | Status: SHIPPED | OUTPATIENT
Start: 2025-01-27

## 2025-01-27 NOTE — TELEPHONE ENCOUNTER
Received request via: Pharmacy    Was the patient seen in the last year in this department? Yes    Does the patient have an active prescription (recently filled or refills available) for medication(s) requested? No    Pharmacy Name: [facility]    Does the patient have retirement Plus and need 100-day supply? (This applies to ALL medications) Patient does not have SCP

## 2025-03-26 ENCOUNTER — APPOINTMENT (OUTPATIENT)
Dept: MEDICAL GROUP | Facility: LAB | Age: 55
End: 2025-03-26
Payer: COMMERCIAL

## 2025-04-15 ENCOUNTER — APPOINTMENT (OUTPATIENT)
Dept: MEDICAL GROUP | Facility: LAB | Age: 55
End: 2025-04-15
Payer: COMMERCIAL

## 2025-04-24 RX ORDER — TIRZEPATIDE 7.5 MG/.5ML
INJECTION, SOLUTION SUBCUTANEOUS
Qty: 2 ML | Refills: 2 | Status: SHIPPED | OUTPATIENT
Start: 2025-04-24

## 2025-04-24 NOTE — TELEPHONE ENCOUNTER
Received request via: Pharmacy    Was the patient seen in the last year in this department? Yes    Does the patient have an active prescription (recently filled or refills available) for medication(s) requested? No    Pharmacy Name: [facility]    Does the patient have [facility]CF Plus and need 100-day supply? (This applies to ALL medications) Patient does not have SCP

## 2025-05-07 ENCOUNTER — APPOINTMENT (OUTPATIENT)
Dept: MEDICAL GROUP | Facility: LAB | Age: 55
End: 2025-05-07
Payer: COMMERCIAL

## 2025-05-07 ENCOUNTER — OFFICE VISIT (OUTPATIENT)
Dept: MEDICAL GROUP | Facility: LAB | Age: 55
End: 2025-05-07
Payer: COMMERCIAL

## 2025-05-07 VITALS
TEMPERATURE: 97.2 F | RESPIRATION RATE: 16 BRPM | BODY MASS INDEX: 33.32 KG/M2 | DIASTOLIC BLOOD PRESSURE: 76 MMHG | HEART RATE: 68 BPM | WEIGHT: 246 LBS | OXYGEN SATURATION: 95 % | HEIGHT: 72 IN | SYSTOLIC BLOOD PRESSURE: 120 MMHG

## 2025-05-07 DIAGNOSIS — Z00.00 PREVENTATIVE HEALTH CARE: ICD-10-CM

## 2025-05-07 DIAGNOSIS — F17.200 TOBACCO DEPENDENCE: ICD-10-CM

## 2025-05-07 DIAGNOSIS — E66.9 OBESITY (BMI 30-39.9): ICD-10-CM

## 2025-05-07 DIAGNOSIS — E11.9 CONTROLLED TYPE 2 DIABETES MELLITUS WITHOUT COMPLICATION, WITHOUT LONG-TERM CURRENT USE OF INSULIN (HCC): ICD-10-CM

## 2025-05-07 DIAGNOSIS — K63.5 POLYP OF COLON, UNSPECIFIED PART OF COLON, UNSPECIFIED TYPE: ICD-10-CM

## 2025-05-07 LAB
HBA1C MFR BLD: 5.7 % (ref ?–5.8)
POCT INT CON NEG: NEGATIVE
POCT INT CON POS: POSITIVE

## 2025-05-07 PROCEDURE — 3078F DIAST BP <80 MM HG: CPT | Performed by: NURSE PRACTITIONER

## 2025-05-07 PROCEDURE — 99214 OFFICE O/P EST MOD 30 MIN: CPT | Performed by: NURSE PRACTITIONER

## 2025-05-07 PROCEDURE — 3074F SYST BP LT 130 MM HG: CPT | Performed by: NURSE PRACTITIONER

## 2025-05-07 PROCEDURE — 83036 HEMOGLOBIN GLYCOSYLATED A1C: CPT | Performed by: NURSE PRACTITIONER

## 2025-05-07 RX ORDER — TIRZEPATIDE 10 MG/.5ML
10 INJECTION, SOLUTION SUBCUTANEOUS
Qty: 2 ML | Refills: 4 | Status: SHIPPED | OUTPATIENT
Start: 2025-05-07

## 2025-05-07 ASSESSMENT — FIBROSIS 4 INDEX: FIB4 SCORE: 1.31

## 2025-05-07 NOTE — PROGRESS NOTES
Verbal consent was acquired by the patient to use [employer] ambient listening note generation during this visit Yes      Subjective   Harlan Medina is a 55 y.o. male who presents for follow-up  History of Present Illness  The patient is a 55-year-old male who presents for follow-up on type 2 diabetes, weight and chronic issues.. He was last seen in 09/2024. His last A1c was 6.3 in 09/2024, and it was as high as 7.3 in 11/2023. He is taking metformin once a day and injecting Mounjaro 7.5 mg weekly.    He reports overall good health, with the exception of irregular sleep patterns due to his work schedule, which includes both day and night shifts. He has been relying on caffeine to maintain alertness. He has not undergone any surgical procedures since his last visit. He has not had an ophthalmological examination this year but had one last year with no significant findings. He visits the dentist annually. He reports no foot-related issues, including burning sensations or feeling like he is walking on rocks. He also reports no urinary difficulties or changes in his urinary stream. He typically wakes up once during his 3-hour sleep period to urinate. He reports no constipation and has regular bowel movements. He reports no joint-related issues.     He is currently not on any other prescription medications or daily supplements. He is due for a colonoscopy in approximately 6 months, as he had 5 polyps removed during his last procedure in 03/2025. He has been adhering to his prescribed regimen of metformin and Mounjaro. He reports no gastrointestinal issues such as constipation, heartburn, or bloating. His current weight is 246 pounds, a decrease from his previous weight of 247 pounds. He expresses a desire to further reduce his weight to 230 pounds. He reports an increase in appetite compared to his usual intake.    SOCIAL HISTORY  He admits to chewing tobacco and has quit drinking alcohol due to lack of  time.    FAMILY HISTORY  His father is on dialysis.    Review of Systems  Negative except for HPI  Objective   /76   Pulse 68   Temp 36.2 °C (97.2 °F)   Resp 16   Ht 1.829 m (6')   Wt 112 kg (246 lb)   SpO2 95%   Physical Exam  Extremities: No edema, no cyanosis  Monofilament testing with a 10 gram force: sensation intact: intact bilaterally  Visual Inspection: Feet without maceration, ulcers, fissures.  Pedal pulses: intact bilaterally   Gen: NAD  Resp: nonlabored.  Able to speak in full sentences  Psy: pleasant / cooperative.   Neuro:  Alert and oriented x 3  CV: Regular rate and rhythm      Results  Labs   - A1c: 09/2024, 6.3   - A1c: 11/2023, 7.3   - A1c: 5.7       Assessment & Plan  1. Type 2 Diabetes Mellitus  His A1c levels have shown significant improvement, currently at 5.7, indicating a prediabetic state. Weight has decreased from 247 lbs to 246 lbs.  Diagnostic plan: A comprehensive blood work panel will be conducted in late October or early November.  Treatment plan: The dosage of Mounjaro will be increased to 10 mg. He is advised to complete the current prescription before transitioning to the new dosage. If he wishes to further increase the dosage beyond 12.5 mg and 15 mg, he can communicate this via [employer].  Continue metformin.  Make an appointment with his eye doctor.  Continue efforts at exercise.  Continue efforts at a diabetic diet.  Discussed good footcare.  2.  Colon polyps: Encouraged him to schedule his colonoscopy for August.  3.  Chewing tobacco dependence: Encouraged him to visit his dentist 2-3 times per year.  As always encouraged him to discontinue use of chewing tobacco.    4.  Obesity: Encouraged him to work on losing about 30 pounds and I will increase his Mounjaro dose.      Follow-up: The patient is scheduled for a follow-up visit in 6 months.               Please note that this dictation was created using voice recognition software. I have made every reasonable attempt  to correct obvious errors, but I expect that there are errors of grammar and possibly content that I did not discover before finalizing the note.

## 2025-05-14 ENCOUNTER — TELEPHONE (OUTPATIENT)
Dept: MEDICAL GROUP | Facility: LAB | Age: 55
End: 2025-05-14
Payer: COMMERCIAL

## 2025-05-14 NOTE — TELEPHONE ENCOUNTER
Matthias Medina ([place]: [id number])  Rx #: [id number]  Mounjaro 10MG/0.5ML auto-injectors  Form  [employer] Electronic Prior Authorization Form (2017 [employer])

## 2025-05-28 ENCOUNTER — APPOINTMENT (OUTPATIENT)
Dept: MEDICAL GROUP | Facility: LAB | Age: 55
End: 2025-05-28
Payer: COMMERCIAL

## 2025-06-04 ENCOUNTER — PATIENT MESSAGE (OUTPATIENT)
Dept: MEDICAL GROUP | Facility: LAB | Age: 55
End: 2025-06-04
Payer: COMMERCIAL

## 2025-06-04 RX ORDER — TIRZEPATIDE 10 MG/.5ML
10 INJECTION, SOLUTION SUBCUTANEOUS
Qty: 2 ML | Refills: 2 | Status: SHIPPED | OUTPATIENT
Start: 2025-06-04 | End: 2025-06-05

## 2025-06-12 ENCOUNTER — TELEPHONE (OUTPATIENT)
Dept: MEDICAL GROUP | Facility: LAB | Age: 55
End: 2025-06-12
Payer: COMMERCIAL

## 2025-07-14 ENCOUNTER — TELEPHONE (OUTPATIENT)
Dept: MEDICAL GROUP | Facility: LAB | Age: 55
End: 2025-07-14
Payer: COMMERCIAL

## 2025-07-14 NOTE — TELEPHONE ENCOUNTER
Matthias Medina (Key: [id number])  Rx #: [id number]  Mounjaro 5MG/0.5ML auto-injectors  Form  [employer] Electronic Prior Authorization Form (2017 [employer])

## (undated) DEVICE — PACK HIP ARTHROSCOPIC DISPOSABLE

## (undated) DEVICE — HEAD HOLDER JUNIOR/ADULT

## (undated) DEVICE — GOWN WARMING STANDARD FLEX - (30/CA)

## (undated) DEVICE — SODIUM CHL. IRRIGATION 0.9% 3000ML (4EA/CA 65CA/PF)

## (undated) DEVICE — HUMID-VENT HEAT AND MOISTURE EXCHANGE- (50/BX)

## (undated) DEVICE — PACK SHOULDER ARTHROSCOPY SM - (2EA/CA)

## (undated) DEVICE — BAG SPONGE COUNT 10.25 X 32 - BLUE (250/CA)

## (undated) DEVICE — GLOVE PROTEXIS PI MICRO SZ 8.5 (200PR/CA)

## (undated) DEVICE — DRAPE C-ARM LARGE 41IN X 74 IN - (10/BX 2BX/CA)

## (undated) DEVICE — SODIUM CHL IRRIGATION 0.9% 1000ML (12EA/CA)

## (undated) DEVICE — SYRINGE 20 ML LL (50EA/BX 4BX/CA)

## (undated) DEVICE — BLADE SHAVER BONECUTTER 4.5MM DSP (3EA/BX)

## (undated) DEVICE — DRAPE STRLE REG TOWEL 18X24 - (10/BX 4BX/CA)"

## (undated) DEVICE — TUBING PUMP WITH CONNECTOR REDEUCE (1EA)

## (undated) DEVICE — ELECTRODE DUAL RETURN W/ CORD - (50/PK)

## (undated) DEVICE — DRAPE VERTICAL ISOLATION - (10EA/CA)

## (undated) DEVICE — KIT ANESTHESIA W/CIRCUIT & 3/LT BAG W/FILTER (20EA/CA)

## (undated) DEVICE — SUTURE GENERAL

## (undated) DEVICE — ELECTRODE 850 FOAM ADHESIVE - HYDROGEL RADIOTRNSPRNT (50/PK)

## (undated) DEVICE — TUBING PATIENT W/CONNECTOR REDEUCE (1EA)

## (undated) DEVICE — PROTECTOR ULNA NERVE - (36PR/CA)

## (undated) DEVICE — DRAPE LARGE 3 QUARTER - (20/CA)

## (undated) DEVICE — LACTATED RINGERS INJ 1000 ML - (14EA/CA 60CA/PF)

## (undated) DEVICE — TUBE CONNECTING SUCTION - CLEAR PLASTIC STERILE 72 IN (50EA/CA)

## (undated) DEVICE — CHLORAPREP 26 ML APPLICATOR - ORANGE TINT(25/CA)

## (undated) DEVICE — GLOVE BIOGEL PI ULTRATOUCH SZ 7.0 SURGICAL PF LF- POWDER FREE (50/BX 4BX/CA)

## (undated) DEVICE — WATER IRRIGATION STERILE 1000ML (12EA/CA)

## (undated) DEVICE — SUCTION INSTRUMENT YANKAUER BULBOUS TIP W/O VENT (50EA/CA)

## (undated) DEVICE — SUTURE 3-0 ETHILON FS-1 - (36/BX) 30 INCH

## (undated) DEVICE — SENSOR SPO2 NEO LNCS ADHESIVE (20/BX) SEE USER NOTES

## (undated) DEVICE — GLOVE BIOGEL PI INDICATOR SZ 8.0 SURGICAL PF LF -(50/BX 4BX/CA)

## (undated) DEVICE — GLOVE BIOGEL PI ORTHO SZ 9 PF LF

## (undated) DEVICE — FORMULA HIP RESECTOR XL (5EA/BX)

## (undated) DEVICE — GLOVE SZ 7.5 LF PROTEXIS (50PR/BX)

## (undated) DEVICE — SUTURE 2-0 ETHILON FS - (36/BX) 18 INCH

## (undated) DEVICE — SHAVER PLUS ELITE LONG INCISOR 4.5MM (3EA/BX)

## (undated) DEVICE — STAPLER SKIN DISP - (6/BX 10BX/CA) VISISTAT

## (undated) DEVICE — BLADE BEAVER 6400 MINI EYE ROUND TIP SHARP ON ONE SIDE (20/CA)

## (undated) DEVICE — TOWELS CLOTH SURGICAL - (4/PK 20PK/CA)

## (undated) DEVICE — GLOVE BIOGEL PI INDICATOR SZ 7.5 SURGICAL PF LF -(50/BX 4BX/CA)

## (undated) DEVICE — DRESSING INTEGUSEAL MICROBIAL SEALANT IS100 (20EA/CA)

## (undated) DEVICE — Device

## (undated) DEVICE — NEPTUNE 4 PORT MANIFOLD - (20/PK)

## (undated) DEVICE — CANISTER SUCTION RIGID RED 1500CC (40EA/CA)

## (undated) DEVICE — NEEDLE SAFETY 18 GA X 1 1/2 IN (100EA/BX)

## (undated) DEVICE — MASK ANESTHESIA ADULT  - (100/CA)

## (undated) DEVICE — BUR HIP AGGRESSIVE PLUS STRIGHT LONG 5.0MM (5EA/BX)

## (undated) DEVICE — SHAVER HIP 8 FLUTE XL 5.5MM (5EA/BX)

## (undated) DEVICE — PROBE VULCAN INTEGRA ABLATOR - E-FLEX

## (undated) DEVICE — GLOVE SURGICAL PROTEXIS 8 1/2 - (50PR/BX)

## (undated) DEVICE — GLOVE, LITE (PAIR)

## (undated) DEVICE — GLOVE BIOGEL PI ORTHO SZ 8.5 PF LF (40/BX)